# Patient Record
Sex: FEMALE | Race: WHITE | Employment: PART TIME | URBAN - METROPOLITAN AREA
[De-identification: names, ages, dates, MRNs, and addresses within clinical notes are randomized per-mention and may not be internally consistent; named-entity substitution may affect disease eponyms.]

---

## 2022-01-25 ENCOUNTER — OFFICE VISIT (OUTPATIENT)
Dept: OBGYN CLINIC | Age: 27
End: 2022-01-25
Payer: MEDICARE

## 2022-01-25 ENCOUNTER — HOSPITAL ENCOUNTER (OUTPATIENT)
Age: 27
Setting detail: SPECIMEN
Discharge: HOME | End: 2022-01-25

## 2022-01-25 ENCOUNTER — HOSPITAL ENCOUNTER (OUTPATIENT)
Facility: CLINIC | Age: 27
Discharge: HOME | End: 2022-01-25
Payer: MEDICARE

## 2022-01-25 VITALS
BODY MASS INDEX: 39.74 KG/M2 | WEIGHT: 224.3 LBS | HEIGHT: 63 IN | DIASTOLIC BLOOD PRESSURE: 73 MMHG | HEART RATE: 104 BPM | SYSTOLIC BLOOD PRESSURE: 119 MMHG

## 2022-01-25 DIAGNOSIS — O09.91 HIGH-RISK PREGNANCY IN FIRST TRIMESTER: ICD-10-CM

## 2022-01-25 DIAGNOSIS — O99.210 OBESITY IN PREGNANCY: ICD-10-CM

## 2022-01-25 DIAGNOSIS — D69.1 DELTA STORAGE POOL DISEASE (HCC): ICD-10-CM

## 2022-01-25 DIAGNOSIS — N92.6 MISSED MENSES: Primary | ICD-10-CM

## 2022-01-25 DIAGNOSIS — Z32.01 POSITIVE PREGNANCY TEST: ICD-10-CM

## 2022-01-25 PROBLEM — O09.90 HIGH RISK PREGNANCY, ANTEPARTUM: Status: ACTIVE | Noted: 2022-01-25

## 2022-01-25 LAB
ABO/RH: NORMAL
AMPHETAMINE SCREEN URINE: NEGATIVE
ANTIBODY SCREEN: NEGATIVE
BARBITURATE SCREEN URINE: NEGATIVE
BENZODIAZEPINE SCREEN, URINE: NEGATIVE
BUPRENORPHINE URINE: NORMAL
CANNABINOID SCREEN URINE: NEGATIVE
COCAINE METABOLITE, URINE: NEGATIVE
CONTROL: YES
HEPATITIS C ANTIBODY: NONREACTIVE
HIV AG/AB: NONREACTIVE
MDMA URINE: NORMAL
METHADONE SCREEN, URINE: NEGATIVE
METHAMPHETAMINE, URINE: NORMAL
OPIATES, URINE: NEGATIVE
OXYCODONE SCREEN URINE: NEGATIVE
PHENCYCLIDINE, URINE: NEGATIVE
PREGNANCY TEST URINE, POC: POSITIVE
PROPOXYPHENE, URINE: NORMAL
TEST INFORMATION: NORMAL
TRICYCLIC ANTIDEPRESSANTS, UR: NORMAL

## 2022-01-25 PROCEDURE — 82950 GLUCOSE TEST: CPT

## 2022-01-25 PROCEDURE — 87591 N.GONORRHOEAE DNA AMP PROB: CPT

## 2022-01-25 PROCEDURE — 87491 CHLMYD TRACH DNA AMP PROBE: CPT

## 2022-01-25 PROCEDURE — 80307 DRUG TEST PRSMV CHEM ANLYZR: CPT

## 2022-01-25 PROCEDURE — 1036F TOBACCO NON-USER: CPT | Performed by: ADVANCED PRACTICE MIDWIFE

## 2022-01-25 PROCEDURE — G8427 DOCREV CUR MEDS BY ELIG CLIN: HCPCS | Performed by: ADVANCED PRACTICE MIDWIFE

## 2022-01-25 PROCEDURE — G8417 CALC BMI ABV UP PARAM F/U: HCPCS | Performed by: ADVANCED PRACTICE MIDWIFE

## 2022-01-25 PROCEDURE — G8484 FLU IMMUNIZE NO ADMIN: HCPCS | Performed by: ADVANCED PRACTICE MIDWIFE

## 2022-01-25 PROCEDURE — 36415 COLL VENOUS BLD VENIPUNCTURE: CPT

## 2022-01-25 PROCEDURE — 81025 URINE PREGNANCY TEST: CPT | Performed by: ADVANCED PRACTICE MIDWIFE

## 2022-01-25 PROCEDURE — 87086 URINE CULTURE/COLONY COUNT: CPT

## 2022-01-25 PROCEDURE — 99214 OFFICE O/P EST MOD 30 MIN: CPT | Performed by: ADVANCED PRACTICE MIDWIFE

## 2022-01-25 RX ORDER — SWAB
1 SWAB, NON-MEDICATED MISCELLANEOUS DAILY
Qty: 30 TABLET | Refills: 11 | Status: SHIPPED | OUTPATIENT
Start: 2022-01-25

## 2022-01-25 SDOH — ECONOMIC STABILITY: TRANSPORTATION INSECURITY
IN THE PAST 12 MONTHS, HAS THE LACK OF TRANSPORTATION KEPT YOU FROM MEDICAL APPOINTMENTS OR FROM GETTING MEDICATIONS?: NO

## 2022-01-25 SDOH — ECONOMIC STABILITY: FOOD INSECURITY: WITHIN THE PAST 12 MONTHS, YOU WORRIED THAT YOUR FOOD WOULD RUN OUT BEFORE YOU GOT MONEY TO BUY MORE.: NEVER TRUE

## 2022-01-25 SDOH — ECONOMIC STABILITY: FOOD INSECURITY: WITHIN THE PAST 12 MONTHS, THE FOOD YOU BOUGHT JUST DIDN'T LAST AND YOU DIDN'T HAVE MONEY TO GET MORE.: NEVER TRUE

## 2022-01-25 SDOH — ECONOMIC STABILITY: TRANSPORTATION INSECURITY
IN THE PAST 12 MONTHS, HAS LACK OF TRANSPORTATION KEPT YOU FROM MEETINGS, WORK, OR FROM GETTING THINGS NEEDED FOR DAILY LIVING?: NO

## 2022-01-25 ASSESSMENT — PATIENT HEALTH QUESTIONNAIRE - PHQ9
SUM OF ALL RESPONSES TO PHQ QUESTIONS 1-9: 0
SUM OF ALL RESPONSES TO PHQ QUESTIONS 1-9: 0
2. FEELING DOWN, DEPRESSED OR HOPELESS: 0
1. LITTLE INTEREST OR PLEASURE IN DOING THINGS: 0
SUM OF ALL RESPONSES TO PHQ9 QUESTIONS 1 & 2: 0
SUM OF ALL RESPONSES TO PHQ QUESTIONS 1-9: 0
SUM OF ALL RESPONSES TO PHQ QUESTIONS 1-9: 0

## 2022-01-25 ASSESSMENT — SOCIAL DETERMINANTS OF HEALTH (SDOH): HOW HARD IS IT FOR YOU TO PAY FOR THE VERY BASICS LIKE FOOD, HOUSING, MEDICAL CARE, AND HEATING?: NOT HARD AT ALL

## 2022-01-25 NOTE — PROGRESS NOTES
Pt is here today for her AMENORRHEA appointment  LMP: approx 11/01/2021  Patient has no questions or concerns

## 2022-01-25 NOTE — PROGRESS NOTES
SUBJECTIVE:    Chief Complaint:  Chief Complaint   Patient presents with    Amenorrhea     lmp 2021 approx. HPI:  Jan Romero is a 27 y.o. female being seen today for missed menses. She reports a positive pregnancy test on end of 2021. This is not a planned pregnancy. She is accepting at this time. LMP: Patient's last menstrual period was 2021 (within days). Sure and definite: Yes. Regular monthly cycle: Yes    She was not on a contraceptive at the time of conception. Estimated weeks gestation today: 12w1d  Tentative YOAN: 2022    Relationship with FOB: Vee Ilda, engaged  G1, and G3 same FOB    OBJECTIVE:    /73   Pulse 104   Ht 5' 2.5\" (1.588 m)   Wt 224 lb 4.8 oz (101.7 kg)   LMP 2021 (Within Days)  Body mass index is 40.37 kg/m². Mother's ethnicity:    Father's ethnicity:      She is complaining today of:   Pain: No  Cramping: No  Bleeding or spotting: No    Nausea: No  Vomiting: No    Breast enlargement/tenderness: Yes  Fatigue: No  Frequency of urination: Yes    Previous high risk obstetrical history: No  OB History    Para Term  AB Living   2 2 2     2   SAB IAB Ectopic Molar Multiple Live Births             2      # Outcome Date GA Lbr Torey/2nd Weight Sex Delivery Anes PTL Lv   2 Term 18 40w4d 08:32 / 01:59 7 lb (3.175 kg) F Vag-Spont EPI N RAYNA   1 Term 01/15/16 39w1d  6 lb 5.6 oz (2.88 kg) M Vag-Spont EPI N RAYNA      Complications: Vacuum extractor delivery, delivered       ROS was done and is negative except as documented in HPI. History was reviewed as documented on Epic Navigator.  bpm via doppler     ASSESSMENT/PLAN:  Missed menses with + UCG  · UCG was done and noted as positive  · Prenatal vitamins were ordered:  Yes  · Discussed self-help for nausea, avoiding OTC medications until consulting provider or pharmacist.  · Ultrasound for dating and viability was ordered and instructed to complete before OB Hx visit: Yes   · Initial information on genetic testing was given: Yes  · Reviewed option for 1st trimester screening ([facility] screen at [facility]) at 10-13 weeks or Myriad NIPS which will be drawn at next provider visit (as long as > 10 weeks GA). Missed window for 1st tri screen. · Reviewed initial prenatal labs will be ordered today, instructed to complete before OB Hx visit. She consents to UDS and HIV: Yes  · 1 hour glucola needed: Yes  · She was instructed to call with any concerns or worsening of any symptoms  · Given New OB packet (nausea/vomiting in pregnancy, flu vaccine, safe medications in pregnancy, weight gain recommendations in pregnancy). 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 Return in about 2 weeks (around 2/8/2022) for OB history with Gina. The patient, Kiesha Jaime, was seen with a total time spent of 30 minutes for the visit on this date of service by the [facility]  The time component involved both face-to-face (counseling and education) and non face-to-face time (care coordination), spent in determining the total time component.       Electronically signed by: MONI Rodriguez CNM

## 2022-01-26 PROBLEM — Z28.39 SUSCEPTIBLE TO VARICELLA (NON-IMMUNE), CURRENTLY PREGNANT: Status: ACTIVE | Noted: 2022-01-26

## 2022-01-26 PROBLEM — O99.210 OBESITY IN PREGNANCY: Status: ACTIVE | Noted: 2022-01-26

## 2022-01-26 PROBLEM — O09.899 SUSCEPTIBLE TO VARICELLA (NON-IMMUNE), CURRENTLY PREGNANT: Status: ACTIVE | Noted: 2022-01-26

## 2022-01-26 LAB
ABSOLUTE EOS #: 0.23 K/UL (ref 0–0.44)
ABSOLUTE IMMATURE GRANULOCYTE: 0.04 K/UL (ref 0–0.3)
ABSOLUTE LYMPH #: 1.88 K/UL (ref 1.1–3.7)
ABSOLUTE MONO #: 0.74 K/UL (ref 0.1–1.2)
BASOPHILS # BLD: 0 % (ref 0–2)
BASOPHILS ABSOLUTE: 0.03 K/UL (ref 0–0.2)
C. TRACHOMATIS DNA ,URINE: NEGATIVE
DIFFERENTIAL TYPE: ABNORMAL
EOSINOPHILS RELATIVE PERCENT: 2 % (ref 1–4)
GLUCOSE ADMINISTRATION: NORMAL
GLUCOSE TOLERANCE SCREEN 50G: 90 MG/DL (ref 70–135)
HCT VFR BLD CALC: 38.6 % (ref 36.3–47.1)
HEMOGLOBIN: 12.7 G/DL (ref 11.9–15.1)
HEPATITIS B SURFACE ANTIGEN: NONREACTIVE
IMMATURE GRANULOCYTES: 0 %
LYMPHOCYTES # BLD: 19 % (ref 24–43)
MCH RBC QN AUTO: 30.8 PG (ref 25.2–33.5)
MCHC RBC AUTO-ENTMCNC: 32.9 G/DL (ref 28.4–34.8)
MCV RBC AUTO: 93.5 FL (ref 82.6–102.9)
MONOCYTES # BLD: 8 % (ref 3–12)
N. GONORRHOEAE DNA, URINE: NEGATIVE
NRBC AUTOMATED: 0 PER 100 WBC
PDW BLD-RTO: 13.1 % (ref 11.8–14.4)
PLATELET # BLD: 310 K/UL (ref 138–453)
PLATELET ESTIMATE: ABNORMAL
PMV BLD AUTO: 9.6 FL (ref 8.1–13.5)
RBC # BLD: 4.13 M/UL (ref 3.95–5.11)
RBC # BLD: ABNORMAL 10*6/UL
RUBV IGG SER QL: 26.9 IU/ML
SEG NEUTROPHILS: 71 % (ref 36–65)
SEGMENTED NEUTROPHILS ABSOLUTE COUNT: 6.81 K/UL (ref 1.5–8.1)
SPECIMEN DESCRIPTION: NORMAL
T. PALLIDUM, IGG: NONREACTIVE
VZV IGG SER QL IA: 1
WBC # BLD: 9.7 K/UL (ref 3.5–11.3)
WBC # BLD: ABNORMAL 10*3/UL

## 2022-01-27 LAB
CULTURE: NO GROWTH
Lab: NORMAL
SPECIMEN DESCRIPTION: NORMAL

## 2022-02-14 ENCOUNTER — TELEPHONE (OUTPATIENT)
Dept: OBGYN CLINIC | Age: 27
End: 2022-02-14

## 2022-02-14 NOTE — TELEPHONE ENCOUNTER
----- Message from MONI Yip CNM sent at 2/11/2022 12:59 PM EST -----  Regarding: RE: f/u  Also make sure to document your calls otherwise it doesn't look like we've tried to reach her. Thanks! !  ----- Message -----  From: Rayray Huff MA  Sent: 2/11/2022  12:40 PM EST  To: MONI Yip CNM  Subject: f/u                                              [facility]p patients and additional contact's numbers are no longer in service   ----- Message -----  From: MONI Yip CNM  Sent: 2/11/2022  12:18 PM EST  To: Rayray Huff MA    Can we call her and see if she's still staying with us or what's going on? She never did her dating U/S or scheduled her IPV.  Thanks

## 2022-02-22 ENCOUNTER — HOSPITAL ENCOUNTER (OUTPATIENT)
Dept: ULTRASOUND IMAGING | Facility: CLINIC | Age: 27
Discharge: HOME | End: 2022-02-24
Payer: MEDICARE

## 2022-02-22 DIAGNOSIS — Z32.01 POSITIVE PREGNANCY TEST: ICD-10-CM

## 2022-02-22 DIAGNOSIS — O09.90 HIGH RISK PREGNANCY, ANTEPARTUM: Primary | ICD-10-CM

## 2022-02-22 DIAGNOSIS — D69.1 DELTA STORAGE POOL DISEASE (HCC): ICD-10-CM

## 2022-02-22 DIAGNOSIS — O99.210 OBESITY IN PREGNANCY: ICD-10-CM

## 2022-02-22 PROCEDURE — 76805 OB US >/= 14 WKS SNGL FETUS: CPT

## 2022-03-08 ENCOUNTER — INITIAL PRENATAL (OUTPATIENT)
Dept: OBGYN CLINIC | Age: 27
End: 2022-03-08
Payer: MEDICARE

## 2022-03-08 VITALS
DIASTOLIC BLOOD PRESSURE: 73 MMHG | SYSTOLIC BLOOD PRESSURE: 114 MMHG | WEIGHT: 223.5 LBS | HEART RATE: 90 BPM | HEIGHT: 62 IN | BODY MASS INDEX: 41.13 KG/M2

## 2022-03-08 DIAGNOSIS — R55 NEUROLOGIC CARDIAC SYNCOPE: ICD-10-CM

## 2022-03-08 DIAGNOSIS — O09.90 HIGH RISK PREGNANCY, ANTEPARTUM: Primary | ICD-10-CM

## 2022-03-08 DIAGNOSIS — R01.1 MURMUR: ICD-10-CM

## 2022-03-08 DIAGNOSIS — Z3A.18 18 WEEKS GESTATION OF PREGNANCY: ICD-10-CM

## 2022-03-08 DIAGNOSIS — O09.31 INSUFFICIENT PRENATAL CARE IN FIRST TRIMESTER: ICD-10-CM

## 2022-03-08 DIAGNOSIS — D69.1 DELTA STORAGE POOL DISEASE (HCC): ICD-10-CM

## 2022-03-08 DIAGNOSIS — O99.210 OBESITY IN PREGNANCY: ICD-10-CM

## 2022-03-08 PROCEDURE — G8427 DOCREV CUR MEDS BY ELIG CLIN: HCPCS | Performed by: ADVANCED PRACTICE MIDWIFE

## 2022-03-08 PROCEDURE — G8417 CALC BMI ABV UP PARAM F/U: HCPCS | Performed by: ADVANCED PRACTICE MIDWIFE

## 2022-03-08 PROCEDURE — G8484 FLU IMMUNIZE NO ADMIN: HCPCS | Performed by: ADVANCED PRACTICE MIDWIFE

## 2022-03-08 PROCEDURE — 1036F TOBACCO NON-USER: CPT | Performed by: ADVANCED PRACTICE MIDWIFE

## 2022-03-08 PROCEDURE — 99215 OFFICE O/P EST HI 40 MIN: CPT | Performed by: ADVANCED PRACTICE MIDWIFE

## 2022-03-08 RX ORDER — ASPIRIN 81 MG/1
81 TABLET, CHEWABLE ORAL DAILY
Qty: 30 TABLET | Refills: 5 | Status: ON HOLD
Start: 2022-03-08 | End: 2022-08-02 | Stop reason: HOSPADM

## 2022-03-08 SDOH — ECONOMIC STABILITY: FOOD INSECURITY: WITHIN THE PAST 12 MONTHS, YOU WORRIED THAT YOUR FOOD WOULD RUN OUT BEFORE YOU GOT MONEY TO BUY MORE.: NEVER TRUE

## 2022-03-08 SDOH — ECONOMIC STABILITY: FOOD INSECURITY: WITHIN THE PAST 12 MONTHS, THE FOOD YOU BOUGHT JUST DIDN'T LAST AND YOU DIDN'T HAVE MONEY TO GET MORE.: NEVER TRUE

## 2022-03-08 ASSESSMENT — SOCIAL DETERMINANTS OF HEALTH (SDOH)
WITHIN THE LAST YEAR, HAVE YOU BEEN HUMILIATED OR EMOTIONALLY ABUSED IN OTHER WAYS BY YOUR PARTNER OR EX-PARTNER?: NO
WITHIN THE LAST YEAR, HAVE YOU BEEN KICKED, HIT, SLAPPED, OR OTHERWISE PHYSICALLY HURT BY YOUR PARTNER OR EX-PARTNER?: NO
HOW HARD IS IT FOR YOU TO PAY FOR THE VERY BASICS LIKE FOOD, HOUSING, MEDICAL CARE, AND HEATING?: NOT HARD AT ALL
WITHIN THE LAST YEAR, HAVE TO BEEN RAPED OR FORCED TO HAVE ANY KIND OF SEXUAL ACTIVITY BY YOUR PARTNER OR EX-PARTNER?: NO
WITHIN THE LAST YEAR, HAVE YOU BEEN AFRAID OF YOUR PARTNER OR EX-PARTNER?: NO

## 2022-03-08 ASSESSMENT — PATIENT HEALTH QUESTIONNAIRE - PHQ9
SUM OF ALL RESPONSES TO PHQ QUESTIONS 1-9: 0
SUM OF ALL RESPONSES TO PHQ QUESTIONS 1-9: 0
1. LITTLE INTEREST OR PLEASURE IN DOING THINGS: 0
SUM OF ALL RESPONSES TO PHQ QUESTIONS 1-9: 0
2. FEELING DOWN, DEPRESSED OR HOPELESS: 0
SUM OF ALL RESPONSES TO PHQ9 QUESTIONS 1 & 2: 0
SUM OF ALL RESPONSES TO PHQ QUESTIONS 1-9: 0

## 2022-03-08 ASSESSMENT — ANXIETY QUESTIONNAIRES
GAD7 TOTAL SCORE: 0
1. FEELING NERVOUS, ANXIOUS, OR ON EDGE: 0
7. FEELING AFRAID AS IF SOMETHING AWFUL MIGHT HAPPEN: 0
3. WORRYING TOO MUCH ABOUT DIFFERENT THINGS: 0
2. NOT BEING ABLE TO STOP OR CONTROL WORRYING: 0
6. BECOMING EASILY ANNOYED OR IRRITABLE: 0
5. BEING SO RESTLESS THAT IT IS HARD TO SIT STILL: 0
4. TROUBLE RELAXING: 0

## 2022-03-08 NOTE — PROGRESS NOTES
[facility]  [place] [address] [address] Stephany Galvez [address] [facility] [address]  Dept: [phone]    New Obstetric Intake     Subjective:    Patient Libertad Pedraza  Patient Age: 27 y. o. Date of Visit: 3/8/2022  Patient's estimated gestational age at visit: 18w1d    Any Concerns Today: no; states wrong number has been on file so she has not gotten ours/MFM's calls  Patient reports no complaints. She denies spotting, cramping or pain. FOB not involved, infidelity. OB History        3    Para   2    Term   2            AB        Living   2       SAB        IAB        Ectopic        Molar        Multiple        Live Births   2                Information about this pregnancy:    Planned Pregnancy: No, Accepting: Yes   Father of Baby: Mono - NOT INVOLVED   Patient's last menstrual period was 2021 (within days). , Regular menses:  Yes   Date of Last Pap Smear: ? Wants at 6 week PP   On Birth Control at Time of Conception: no   Early Dating Ultrasound: completed   Desires first trimester screening: No too late, M to offer NIPT   Desires CF/SMA/FragileX screening: CF negative last pregnancy    Risk Screening   Patient Occupation: [employer], Environmental/Work Hazards: No   Smoker: No   History of Substance Abuse: no   History of Sexual Abuse: no   Current of past history of intimate partner violence: no   Teratogen Exposure since finding out pregnant: no   Pets at home: no    Infection History:   Personal History of Chicken Pox or varicella vaccination: non immune per blood work    Agreeable to flu shot: No   Agreeable to tdap: unsure    Exposed to TB or live with someone with TB: no   Patient or partner history of genital herpes: no   Rash or viral illness since last menstrual period: no   Prior GBS-infected child: no   History of sexually transmitted infection(s) (STIs): no    Any recent travel within the last 3 months out of the country: no, Partner: no    Previous Birth History:    Vaginal Birth: yes    Birth: no   Any previous birth complications: no   History of hemorrhage during/after birth: no    High Risk Factor Screening for this Pregnancy:   Age greater than 28 at delivery: No   History of  delivery: no   History of diabetes (gestational or outside of pregnancy): No, On medications: No   Screening for pregestational diabetes:   o BMI greater than 30: Yes. If Yes, need early glucola  o BMI greater than 25 ( Americans greater than 23): No If Yes, need 1 more risk factor.   - Physical inactivity: No  - First-degree relative with diabetes: No  - High-risk race of ethnicity (eg, [place] American, , , [place] American, [facility]): No  - Previously given birth to an infant weighing 1bef44xf (4000g) or more: No  - History of hypertension: No  - HDL < 35mg/dL and/or a trglyceride level greater than 250 mg/dL: No  - History of polycystic ovarian syndrome: No  - A1c greater than or equal to 5.7%: No   History of Hypertension: No, On medications: NA   History of bleeding disorders: No    See Epic Navigator for further genetic and risk screening. Objective:  /73   Pulse 90   Ht 5' 2\" (1.575 m)   Wt 223 lb 8 oz (101.4 kg)   LMP 2021 (Within Days)   BMI 40.88 kg/m²   Body mass index is 40.88 kg/m². OB Physical - proven pelvis 7#, declines pap till 6 weeks PP      Mother's Ethnicity:   Father's Ethnicity:     Assessment/Plan:  Pregnancy at 21w2d    Role of ultrasound in pregnancy discussed; fetal survey: requests   She was counseled on office policies. She was educated about the anticipated course of prenatal care.  Warning signs were reviewed.    The patient was counseled on drug screening, HIV, Cystic Fibrosis, SMA and Sickle Cell disease, as appropriate.   o HIV and UDS already done, negative  o CF already negative last pregnancy   She was counseled on Toxoplasmosis/Listeriosis (cats/raw meat).  She denies tobacco use.  The patient was informed of a 2-4% risk of congenital anomalies in the general population. She was questioned in detail regarding any genetic misnomer history, chromosomal abnormalities, or learning disabilities in herself, the father of the baby or their families. She denies any history as stated above.  Too late for 1st tri screen, reviewed NIPT will be offered by MF    AFP discussed and DECLINED today    Encouraged well-balanced diet, plenty of rest when needed, prenatal vitamins daily and walking for exercise. Discussed self-help for nausea, avoiding OTC medications until consulting provider or pharmacist, other than Tylenol PRN, minimal caffeine (1-2 cups daily) and avoiding alcohol.  The patient was informed that [facility] only delivers at [address] and is agreeable to delivery at [address]. High risk pregnancy, antepartum  · Referral for anatomy scan already in place, had wrong number in chart so MFM unable to reach her. Given [facility] number to call and get scheduled. Delta storage pool disease [facility])  · Not following with specialist  · MFM referral already in place for consultation    Obesity in pregnancy  · aspirin (ASPIRIN CHILDRENS) 81 MG chewable tablet; Take 1 tablet by mouth daily  · Early glucola 90  · NSTs weekly at 34 weeks  · TWG -8 oz (-0.227 kg) - continue to monitor     Neurologic cardiac syncope  · Denies any recent issues, Hx Florinef use prior to pregnancy  · Reviewed care in pregnancy and s/s to report    Murmur  · Noted in chart from age 15 yo but pt was unaware, no murmur on exam  · Denies s/s, reviewed s/s to report    Insufficient prenatal care in first trimester  · Denies any social determinants of health needs, states had car issues and had the wrong number on file.  Had confirmation visit at 12 weeks and did not complete dating U/S for 4 weeks following. Reviewed importance of attending visits and she verbalizes understanding. Upon completion of the visit all questions were answered. ROS was done and is negative except as documented in HPI. History was reviewed as documented on Epic Navigator. The patient, Chris Corona, was seen with a total time spent of 45 minutes for the visit on this date of service by the [facility]  The time component involved both face-to-face (counseling and education) and non face-to-face time (care coordination), spent in determining the total time component. Return in about 4 weeks (around 4/5/2022) for OB visit with Midwives.     Electronically Signed by: MONI Noyola CNM

## 2022-03-08 NOTE — PROGRESS NOTES
Pt is here today for her IPV   Pt states she is feeling baby movement   Pt has no questions or concerns

## 2022-03-16 ENCOUNTER — TELEPHONE (OUTPATIENT)
Dept: OBGYN CLINIC | Age: 27
End: 2022-03-16

## 2022-03-16 NOTE — TELEPHONE ENCOUNTER
----- Message from Mirtha Martin, APRN - CNM sent at 3/15/2022  9:59 AM EDT -----  Tobi Shields let me know at her last visit that she got a new number and that's why no one has been able to reach her and I told her to tell the  her new number when she left and I don't think she did. Can you try to call her current number, and if no answer then try her HIPPA contact to get her new number. She needs to get her anatomy scan scheduled and I know they can't reach her. Once you reach her give her MFM's number so she can schedule ASAP.  Thanks

## 2022-03-16 NOTE — TELEPHONE ENCOUNTER
lvm for pt's contact \"Ioana\" to have pt call office. I tried number listed as pt's number and there was no answer and the vm was full.

## 2022-04-01 NOTE — PROGRESS NOTES
SUBJECTIVE:  [facility] is here for her return OB visit. She is doing well and offers no complaints today but does relate that she feels she is more symptomatic with dizziness than her previous pregnancies. Has not heard from [facility] for [address] she says  She reports fetal movement. She denies vaginal bleeding. She denies vaginal discharge. She denies leaking of fluid. She denies uterine contraction activity. She denies nausea and/or vomiting. She denies retaining fluid in her extremities. OBJECTIVE:  Blood pressure 103/69, pulse 85, weight 228 lb 4.8 oz (103.6 kg), last menstrual period 11/01/2021. ASSESSMENT/PLAN:  1. 22 weeks gestation of pregnancy  S=D    2. High risk pregnancy, antepartum  The problem list was reviewed and updated as needed with any new issues today    [facility] will begin to monitor fetal movement daily    AFP previously discussed  Deferred genetic testing to [facility] as needed  28 week Labs were discussed for NOV    BMI and appropriate weight gain recommendations were discussed. Obese: BMI > 30: Gain 11-20 lb    3. 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 Neurologic cardiac syncope  - More symptomatic than previous; stressed increased fluid and salt intake  - Advised if continues to worsen may need to see Cardiology    5. Obesity in pregnancy (Pregravid BMI 40)  - State is taking ASA        [facility] was counseled regarding all of the above      The patient, Ashwini Tse,  was seen with a total time spent of 30 minutes for the visit on this date of service by the [facility]  The time component involved both face-to-face (counseling and education) and non face-to-face time (care coordination), spent in determining the total time component.

## 2022-04-05 ENCOUNTER — ROUTINE PRENATAL (OUTPATIENT)
Dept: OBGYN CLINIC | Age: 27
End: 2022-04-05
Payer: MEDICARE

## 2022-04-05 VITALS
WEIGHT: 228.3 LBS | BODY MASS INDEX: 41.76 KG/M2 | DIASTOLIC BLOOD PRESSURE: 69 MMHG | HEART RATE: 85 BPM | SYSTOLIC BLOOD PRESSURE: 103 MMHG

## 2022-04-05 DIAGNOSIS — R55 NEUROLOGIC CARDIAC SYNCOPE: ICD-10-CM

## 2022-04-05 DIAGNOSIS — D69.1 DELTA STORAGE POOL DISEASE (HCC): ICD-10-CM

## 2022-04-05 DIAGNOSIS — Z3A.22 22 WEEKS GESTATION OF PREGNANCY: ICD-10-CM

## 2022-04-05 DIAGNOSIS — O09.90 HIGH RISK PREGNANCY, ANTEPARTUM: Primary | ICD-10-CM

## 2022-04-05 DIAGNOSIS — O99.210 OBESITY IN PREGNANCY: ICD-10-CM

## 2022-04-05 PROCEDURE — 99214 OFFICE O/P EST MOD 30 MIN: CPT | Performed by: ADVANCED PRACTICE MIDWIFE

## 2022-04-05 PROCEDURE — G8417 CALC BMI ABV UP PARAM F/U: HCPCS | Performed by: ADVANCED PRACTICE MIDWIFE

## 2022-04-05 PROCEDURE — G8427 DOCREV CUR MEDS BY ELIG CLIN: HCPCS | Performed by: ADVANCED PRACTICE MIDWIFE

## 2022-04-05 PROCEDURE — 1036F TOBACCO NON-USER: CPT | Performed by: ADVANCED PRACTICE MIDWIFE

## 2022-04-05 NOTE — PROGRESS NOTES
Pt is here today at her 22w1d prenatal visit  Pt states fetal movement is present  Pt has not seen MFM for anatomy pt states referral not sent over yet. referral was sent 2/22 per MFM unable to contact patient d/t phone number not working. Verified number with patient .      Pt did not leave a urine

## 2022-05-04 ENCOUNTER — ROUTINE PRENATAL (OUTPATIENT)
Dept: OBGYN CLINIC | Age: 27
End: 2022-05-04
Payer: MEDICARE

## 2022-05-04 ENCOUNTER — HOSPITAL ENCOUNTER (OUTPATIENT)
Age: 27
Setting detail: SPECIMEN
Discharge: HOME | End: 2022-05-04

## 2022-05-04 VITALS — BODY MASS INDEX: 43.49 KG/M2 | WEIGHT: 237.8 LBS | SYSTOLIC BLOOD PRESSURE: 124 MMHG | DIASTOLIC BLOOD PRESSURE: 74 MMHG

## 2022-05-04 DIAGNOSIS — Z3A.26 26 WEEKS GESTATION OF PREGNANCY: Primary | ICD-10-CM

## 2022-05-04 DIAGNOSIS — Z23 NEED FOR TDAP VACCINATION: ICD-10-CM

## 2022-05-04 DIAGNOSIS — O09.90 HIGH RISK PREGNANCY, ANTEPARTUM: ICD-10-CM

## 2022-05-04 DIAGNOSIS — O99.210 OBESITY IN PREGNANCY: ICD-10-CM

## 2022-05-04 PROCEDURE — G8427 DOCREV CUR MEDS BY ELIG CLIN: HCPCS | Performed by: ADVANCED PRACTICE MIDWIFE

## 2022-05-04 PROCEDURE — 1036F TOBACCO NON-USER: CPT | Performed by: ADVANCED PRACTICE MIDWIFE

## 2022-05-04 PROCEDURE — G8417 CALC BMI ABV UP PARAM F/U: HCPCS | Performed by: ADVANCED PRACTICE MIDWIFE

## 2022-05-04 PROCEDURE — 99213 OFFICE O/P EST LOW 20 MIN: CPT | Performed by: ADVANCED PRACTICE MIDWIFE

## 2022-05-04 NOTE — PROGRESS NOTES
SUBJECTIVE:  Najma Paulson is here for her return OB visit. She reports fetal movement. She denies vaginal bleeding. She denies vaginal discharge. She denies leaking of fluid. She denies uterine contraction activity. She denies nausea and/or vomiting. She denies retaining fluid in her extremities. Doing GCT today. OBJECTIVE:  Blood pressure 124/74, weight 237 lb 12.8 oz (107.9 kg), last menstrual period 11/01/2021. Najma Paulson has not the flu vaccine as appropriate  Najma Paulson has the Tdap vaccine as appropriate        ASSESSMENT/PLAN:  1. 26 weeks gestation of pregnancy    - Glucose tolerance, 1 hour; Future  - HIV Screen; Future  - T. pallidum Ab; Future  - CBC with Auto Differential; Future    2. Need for Tdap vaccination    - Tetanus-Diphth-Acell Pertussis (BOOSTRIX) injection 0.5 mL    3. Obesity in pregnancy (Pregravid BMI 40)      4. High risk pregnancy, antepartum        The problem list was reviewed and updated with any new issues from today's visit    Najma Paulson will monitor fetal movement daily. 28 week lab panel was discussed and was not ordered. Najma Paulson was counseled regarding all of the above    The patient, Ora Dennis,  was seen with a total time spent of 20 minutes for the visit on this date of service by the [facility]  The time component, involved both face-to-face (counseling and education)  and non face-to-face time (care coordination), spent in determining the total time component.

## 2022-05-04 NOTE — PROGRESS NOTES
Pt is here today at her 26w2 prenatal visit  Pt states fetal movement is present  Pt has no questions or concerns  Pt would like Tdap  After obtaining consent, and per orders of  Nia Selby CNM injection of Tdap given in Left deltoid by Kierra Heath MA.  Patient instructed to remain in clinic for 20 minutes afterwards, and to report any adverse reaction to me immediately  [address] [id number]  Lot [id number]  EXP 1/31/23      50g Glucola given to patient  Finish 1:31p  Draw 2:31p    [address]  [id number]  [id number]  EXP10/31/23

## 2022-05-05 DIAGNOSIS — Z3A.26 26 WEEKS GESTATION OF PREGNANCY: ICD-10-CM

## 2022-05-05 LAB
ABSOLUTE EOS #: 0.16 K/UL (ref 0–0.44)
ABSOLUTE IMMATURE GRANULOCYTE: 0.14 K/UL (ref 0–0.3)
ABSOLUTE LYMPH #: 1.93 K/UL (ref 1.1–3.7)
ABSOLUTE MONO #: 1 K/UL (ref 0.1–1.2)
BASOPHILS # BLD: 0 % (ref 0–2)
BASOPHILS ABSOLUTE: 0.04 K/UL (ref 0–0.2)
EOSINOPHILS RELATIVE PERCENT: 1 % (ref 1–4)
GLUCOSE ADMINISTRATION: NORMAL
GLUCOSE TOLERANCE SCREEN 50G: 81 MG/DL (ref 70–135)
HCT VFR BLD CALC: 36.7 % (ref 36.3–47.1)
HEMOGLOBIN: 11.8 G/DL (ref 11.9–15.1)
IMMATURE GRANULOCYTES: 1 %
LYMPHOCYTES # BLD: 13 % (ref 24–43)
MCH RBC QN AUTO: 32.7 PG (ref 25.2–33.5)
MCHC RBC AUTO-ENTMCNC: 32.2 G/DL (ref 28.4–34.8)
MCV RBC AUTO: 101.7 FL (ref 82.6–102.9)
MONOCYTES # BLD: 7 % (ref 3–12)
NRBC AUTOMATED: 0 PER 100 WBC
PDW BLD-RTO: 13.2 % (ref 11.8–14.4)
PLATELET # BLD: 359 K/UL (ref 138–453)
PMV BLD AUTO: 10.1 FL (ref 8.1–13.5)
RBC # BLD: 3.61 M/UL (ref 3.95–5.11)
SEG NEUTROPHILS: 78 % (ref 36–65)
SEGMENTED NEUTROPHILS ABSOLUTE COUNT: 11.74 K/UL (ref 1.5–8.1)
T. PALLIDUM, IGG: NONREACTIVE
WBC # BLD: 15 K/UL (ref 3.5–11.3)

## 2022-05-07 LAB — HIV AG/AB: NONREACTIVE

## 2022-05-12 ENCOUNTER — ROUTINE PRENATAL (OUTPATIENT)
Dept: PERINATAL CARE | Age: 27
End: 2022-05-12
Payer: MEDICARE

## 2022-05-12 ENCOUNTER — HOSPITAL ENCOUNTER (OUTPATIENT)
Age: 27
Setting detail: SPECIMEN
Discharge: HOME | End: 2022-05-12

## 2022-05-12 VITALS
DIASTOLIC BLOOD PRESSURE: 66 MMHG | HEART RATE: 92 BPM | HEIGHT: 62 IN | WEIGHT: 236 LBS | TEMPERATURE: 97 F | RESPIRATION RATE: 16 BRPM | SYSTOLIC BLOOD PRESSURE: 110 MMHG | BODY MASS INDEX: 43.43 KG/M2

## 2022-05-12 DIAGNOSIS — O99.213 OBESITY AFFECTING PREGNANCY IN THIRD TRIMESTER: Primary | ICD-10-CM

## 2022-05-12 DIAGNOSIS — D69.1 MATERNAL PLATELET DISORDER AFFECTING PREGNANCY IN THIRD TRIMESTER, ANTEPARTUM (HCC): ICD-10-CM

## 2022-05-12 DIAGNOSIS — Z3A.27 27 WEEKS GESTATION OF PREGNANCY: ICD-10-CM

## 2022-05-12 DIAGNOSIS — O35.2XX0 HEREDITARY FAMILIAL DISEASE AFFECTING MANAGEMENT OF MOTHER AND POSSIBLY AFFECTING FETUS, ANTEPARTUM, SINGLE OR UNSPECIFIED FETUS: ICD-10-CM

## 2022-05-12 DIAGNOSIS — O09.33 INSUFFICIENT PRENATAL CARE IN THIRD TRIMESTER: ICD-10-CM

## 2022-05-12 DIAGNOSIS — O99.891 CURRENT MATERNAL CONDITION AFFECTING PREGNANCY: ICD-10-CM

## 2022-05-12 DIAGNOSIS — O99.113 MATERNAL PLATELET DISORDER AFFECTING PREGNANCY IN THIRD TRIMESTER, ANTEPARTUM (HCC): ICD-10-CM

## 2022-05-12 LAB
ABDOMINAL CIRCUMFERENCE: NORMAL
BIPARIETAL DIAMETER: NORMAL
ESTIMATED FETAL WEIGHT: NORMAL
FEMORAL DIAMETER: NORMAL
HC/AC: NORMAL
HEAD CIRCUMFERENCE: NORMAL

## 2022-05-12 PROCEDURE — 76811 OB US DETAILED SNGL FETUS: CPT | Performed by: OBSTETRICS & GYNECOLOGY

## 2022-05-12 PROCEDURE — 99999 PR OFFICE/OUTPT VISIT,PROCEDURE ONLY: CPT | Performed by: OBSTETRICS & GYNECOLOGY

## 2022-05-12 PROCEDURE — 76819 FETAL BIOPHYS PROFIL W/O NST: CPT | Performed by: OBSTETRICS & GYNECOLOGY

## 2022-06-01 ENCOUNTER — ROUTINE PRENATAL (OUTPATIENT)
Dept: OBGYN CLINIC | Age: 27
End: 2022-06-01
Payer: MEDICARE

## 2022-06-01 ENCOUNTER — CLINICAL DOCUMENTATION (OUTPATIENT)
Dept: OBGYN CLINIC | Age: 27
End: 2022-06-01

## 2022-06-01 VITALS
WEIGHT: 242 LBS | DIASTOLIC BLOOD PRESSURE: 80 MMHG | SYSTOLIC BLOOD PRESSURE: 123 MMHG | BODY MASS INDEX: 44.26 KG/M2 | HEART RATE: 103 BPM

## 2022-06-01 DIAGNOSIS — O09.90 HIGH RISK PREGNANCY, ANTEPARTUM: ICD-10-CM

## 2022-06-01 DIAGNOSIS — D69.1 DELTA STORAGE POOL DISEASE (HCC): ICD-10-CM

## 2022-06-01 DIAGNOSIS — Z3A.30 30 WEEKS GESTATION OF PREGNANCY: Primary | ICD-10-CM

## 2022-06-01 PROCEDURE — G8427 DOCREV CUR MEDS BY ELIG CLIN: HCPCS | Performed by: ADVANCED PRACTICE MIDWIFE

## 2022-06-01 PROCEDURE — 1036F TOBACCO NON-USER: CPT | Performed by: ADVANCED PRACTICE MIDWIFE

## 2022-06-01 PROCEDURE — 99213 OFFICE O/P EST LOW 20 MIN: CPT | Performed by: ADVANCED PRACTICE MIDWIFE

## 2022-06-01 PROCEDURE — G8417 CALC BMI ABV UP PARAM F/U: HCPCS | Performed by: ADVANCED PRACTICE MIDWIFE

## 2022-06-01 NOTE — PROGRESS NOTES
Pt is here today at her 30w4d prenatal visit  Pt states fetal movement is present  Pt needs refills on her PNV  Pt did not leave a urine sample

## 2022-06-01 NOTE — PROGRESS NOTES
SUBJECTIVE:  Dania Anderson is here for her return OB visit. She reports fetal movement. She denies  vaginal bleeding. She denies  vaginal discharge. She denies leaking of fluid. She denies uterine contraction activity. She denies nausea and/or vomiting. She denies retaining fluid in her extremities. Wants RRS after recovery. OBJECTIVE:  Blood pressure 123/80, pulse (!) 103, weight 242 lb (109.8 kg). Dania Anderson has not received the flu vaccine as appropriate  Dania Anderson has received the Tdap vaccine as appropriate          ASSESSMENT/PLAN:  1. 30 weeks gestation of pregnancy      2. High risk pregnancy, antepartum      3. 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 28 week lab results were reviewed. Fetal Kick Count was discussed and explained. Tylor-Faustin contractions vs  labor contractions were reviewed. Signs and symptoms of Pre-Eclampsia were were reviewed and discussed  Initial discussion regarding birth plans was begun    Dania Anderson was counseled regarding all of the above    The patient, Fabienne Joel,  was seen with a total time spent of 20 minutes for the visit on this date of service by the [facility]  The time component, involved both face-to-face (counseling and education)  and non face-to-face time (care coordination), spent in determining the total time component.

## 2022-06-15 ENCOUNTER — ROUTINE PRENATAL (OUTPATIENT)
Dept: OBGYN CLINIC | Age: 27
End: 2022-06-15
Payer: MEDICARE

## 2022-06-15 VITALS — WEIGHT: 246 LBS | BODY MASS INDEX: 44.99 KG/M2 | SYSTOLIC BLOOD PRESSURE: 130 MMHG | DIASTOLIC BLOOD PRESSURE: 78 MMHG

## 2022-06-15 DIAGNOSIS — Z3A.32 32 WEEKS GESTATION OF PREGNANCY: Primary | ICD-10-CM

## 2022-06-15 DIAGNOSIS — O09.90 HIGH RISK PREGNANCY, ANTEPARTUM: ICD-10-CM

## 2022-06-15 PROCEDURE — G8427 DOCREV CUR MEDS BY ELIG CLIN: HCPCS | Performed by: ADVANCED PRACTICE MIDWIFE

## 2022-06-15 PROCEDURE — 99213 OFFICE O/P EST LOW 20 MIN: CPT | Performed by: ADVANCED PRACTICE MIDWIFE

## 2022-06-15 PROCEDURE — G8417 CALC BMI ABV UP PARAM F/U: HCPCS | Performed by: ADVANCED PRACTICE MIDWIFE

## 2022-06-15 PROCEDURE — 1036F TOBACCO NON-USER: CPT | Performed by: ADVANCED PRACTICE MIDWIFE

## 2022-06-15 NOTE — PROGRESS NOTES
Pt is here today at her 32w4 prenatal visit  Pt states fetal movement is present  Pt has  No questions or concerns today.   Pt did not leave a urine

## 2022-06-15 NOTE — PROGRESS NOTES
SUBJECTIVE:  Jody Collazo is here for her return OB visit. She reports fetal movement. She denies  vaginal bleeding. She denies  vaginal discharge. She denies leaking of fluid. She denies uterine contraction activity. She denies nausea and/or vomiting. She denies retaining fluid in her extremities. Doing well, no concerns today. OBJECTIVE:  Blood pressure 130/78, weight 246 lb (111.6 kg). Jody Collazo has not received the flu vaccine as appropriate  Jody Collazo has received the Tdap vaccine as appropriate    She has not RhoGam as indicated      ASSESSMENT/PLAN:  1. 32 weeks gestation of pregnancy      2. High risk pregnancy, antepartum        The problem list was reviewed and updated with any new issues from today's visit      Jody Collazo will monitor fetal movement daily. 28 week lab results were reviewed. Fetal Kick Count was discussed and explained. Tylor-Faustin contractions vs  labor contractions were reviewed. Signs and symptoms of Pre-Eclampsia were were reviewed and discussed  Initial discussion regarding birth plans was begun    Jody Collazo was counseled regarding all of the above    The patient, Santos Wilcox,  was seen with a total time spent of 20 minutes for the visit on this date of service by the [facility]  The time component, involved both face-to-face (counseling and education)  and non face-to-face time (care coordination), spent in determining the total time component.

## 2022-06-29 ENCOUNTER — ROUTINE PRENATAL (OUTPATIENT)
Dept: OBGYN CLINIC | Age: 27
End: 2022-06-29
Payer: MEDICARE

## 2022-06-29 VITALS — DIASTOLIC BLOOD PRESSURE: 78 MMHG | BODY MASS INDEX: 45.36 KG/M2 | SYSTOLIC BLOOD PRESSURE: 114 MMHG | WEIGHT: 248 LBS

## 2022-06-29 DIAGNOSIS — Z3A.34 34 WEEKS GESTATION OF PREGNANCY: ICD-10-CM

## 2022-06-29 DIAGNOSIS — O09.90 HIGH RISK PREGNANCY, ANTEPARTUM: Primary | ICD-10-CM

## 2022-06-29 DIAGNOSIS — O99.210 OBESITY IN PREGNANCY: ICD-10-CM

## 2022-06-29 PROCEDURE — 99213 OFFICE O/P EST LOW 20 MIN: CPT | Performed by: ADVANCED PRACTICE MIDWIFE

## 2022-06-29 PROCEDURE — 1036F TOBACCO NON-USER: CPT | Performed by: ADVANCED PRACTICE MIDWIFE

## 2022-06-29 PROCEDURE — G8427 DOCREV CUR MEDS BY ELIG CLIN: HCPCS | Performed by: ADVANCED PRACTICE MIDWIFE

## 2022-06-29 PROCEDURE — 59025 FETAL NON-STRESS TEST: CPT | Performed by: ADVANCED PRACTICE MIDWIFE

## 2022-06-29 PROCEDURE — G8417 CALC BMI ABV UP PARAM F/U: HCPCS | Performed by: ADVANCED PRACTICE MIDWIFE

## 2022-06-29 NOTE — PROGRESS NOTES
SUBJECTIVE:    Fei Roper is here for her return OB visit. She reports  feeling fetal movement. She denies vaginal bleeding. She denies vaginal discharge. She denies leaking of fluid. She denies uterine cramping. She denies  nausea and/or vomiting. OBJECTIVE:  Blood pressure 114/78, weight 248 lb (112.5 kg). Total weight gain: 24 lb (10.9 kg)    NST:   Baseline:  130  Variability:  Moderate  Accelerations:  Present  Decelerations: Absent   Fetal Movement:  Perceived by patient during NST  Contractions: patient denies contractions, contractions Absent  on toco.  Interpretation: Reactive (Category1)      ASSESSMENT/PLAN:  IUP @ 34+4 weeks  S =D    High Risk Pregnancy   Due date is based on 16w3d ultrasound   Patient's prenatal labs are completed. Patient's blood type O positive and rhogam is NOT indicated in pregnancy.  NIPT ordered by [facility]   Anatomy scan completed. Placenta posterior, normal cord insertion, cervical length not done, no low lying/previa noted. Follow up at 34 weeks.  Glucola between 24-28 weeks. 80 PASS   Fetal Kick Count was discussed and explained. She was instructed to lay on her left side 20 minutes after eating and count movements for up to 2 hours with a target value of 10 movements. Instructed to notify office if she does not make the target.  GBS at 36 weeks  2. Obesity in pregnancy  - [place] FETAL NON-STRESS TEST    3. 34 weeks gestation of pregnancy  - Reviewed signs and symptoms of  labor  - Reviewed birth preferences  - Reviewed signs and symptoms of preeclampsia and ramya hick contractions. She was counseled regarding all of the above:    Return in about 1 week (around 2022) for NST. The patient, Fei Roper was seen with a total time spent of 25 minutes for the visit on this date of service by the [facility]  Both face-to-face (counseling and education) and non face-to-face time (care coordination), were spent in determining the total time component. Electronically Signed By: David Malin, Jennifer Rogers

## 2022-06-29 NOTE — PROGRESS NOTES
Pt is here today at her 34w4 prenatal visit  Pt states fetal movement is present  Pt has no questions or concerns      Manual BP , Pt did not leave a urine

## 2022-06-30 ENCOUNTER — ROUTINE PRENATAL (OUTPATIENT)
Dept: PERINATAL CARE | Age: 27
End: 2022-06-30
Payer: MEDICARE

## 2022-06-30 VITALS
TEMPERATURE: 97.1 F | RESPIRATION RATE: 20 BRPM | WEIGHT: 252 LBS | SYSTOLIC BLOOD PRESSURE: 131 MMHG | HEART RATE: 95 BPM | DIASTOLIC BLOOD PRESSURE: 85 MMHG | HEIGHT: 62 IN | BODY MASS INDEX: 46.38 KG/M2

## 2022-06-30 DIAGNOSIS — O99.891 CURRENT MATERNAL CONDITION AFFECTING PREGNANCY: ICD-10-CM

## 2022-06-30 DIAGNOSIS — O09.33 INSUFFICIENT PRENATAL CARE IN THIRD TRIMESTER: Primary | ICD-10-CM

## 2022-06-30 DIAGNOSIS — D69.1 MATERNAL PLATELET DISORDER AFFECTING PREGNANCY IN THIRD TRIMESTER, ANTEPARTUM (HCC): ICD-10-CM

## 2022-06-30 DIAGNOSIS — O35.2XX0 HEREDITARY FAMILIAL DISEASE AFFECTING MANAGEMENT OF MOTHER AND POSSIBLY AFFECTING FETUS, ANTEPARTUM, SINGLE OR UNSPECIFIED FETUS: ICD-10-CM

## 2022-06-30 DIAGNOSIS — Z13.89 ENCOUNTER FOR ROUTINE SCREENING FOR MALFORMATION USING ULTRASONICS: ICD-10-CM

## 2022-06-30 DIAGNOSIS — O99.113 MATERNAL PLATELET DISORDER AFFECTING PREGNANCY IN THIRD TRIMESTER, ANTEPARTUM (HCC): ICD-10-CM

## 2022-06-30 DIAGNOSIS — O99.213 OBESITY AFFECTING PREGNANCY IN THIRD TRIMESTER: ICD-10-CM

## 2022-06-30 DIAGNOSIS — Z3A.34 34 WEEKS GESTATION OF PREGNANCY: ICD-10-CM

## 2022-06-30 DIAGNOSIS — Z36.4 ANTENATAL SCREENING FOR FETAL GROWTH RETARDATION USING ULTRASONICS: ICD-10-CM

## 2022-06-30 PROCEDURE — 76819 FETAL BIOPHYS PROFIL W/O NST: CPT | Performed by: OBSTETRICS & GYNECOLOGY

## 2022-06-30 PROCEDURE — 76816 OB US FOLLOW-UP PER FETUS: CPT | Performed by: OBSTETRICS & GYNECOLOGY

## 2022-06-30 PROCEDURE — 99999 PR OFFICE/OUTPT VISIT,PROCEDURE ONLY: CPT | Performed by: OBSTETRICS & GYNECOLOGY

## 2022-06-30 NOTE — PROGRESS NOTES
Patient previously opted for non-invasive prenatal testing/NIPT (cell-free DNA screening) that is offered to all pregnant patients irrespective of baseline risk or maternal age (Obstet Gynecol 2020; 80; ACOG Practice Bulletin Number 226, Screening for Fetal Chromosomal Abnormalities). Patient previously opted for non-invasive prenatal diagnosis testing (with the [facility] Complete test from [facility]). Has not had drawn yet. Patient previously declined invasive prenatal diagnostic testing today (including for evaluation and testing for fetal aneuploidy, fetal Fragile X Syndrome/autism panel testing, fetal microdeletions, fetal single gene disorders, fetal microarray testing, etc). Laboratory evaluation for Fragile X Syndrome testing requested (family history of autism/developmental delay) previously. Has not had drawn yet. Patient declines Maternal-Fetal Medicine consultation at this time regarding the medical/obstetrical complications of pregnancy. Maternal-Fetal Medicine (MFM) attending physician will defer all management for the medical/obstetrical complications of pregnancy to the primary attending obstetrical physician/provider, as a result. Therefore, only an ultrasound evaluation was completed today in the MFM office.

## 2022-07-07 ENCOUNTER — HOSPITAL ENCOUNTER (OUTPATIENT)
Age: 27
Setting detail: SPECIMEN
Discharge: HOME | End: 2022-07-07

## 2022-07-07 ENCOUNTER — ROUTINE PRENATAL (OUTPATIENT)
Dept: OBGYN CLINIC | Age: 27
End: 2022-07-07
Payer: MEDICARE

## 2022-07-07 VITALS
WEIGHT: 246.8 LBS | SYSTOLIC BLOOD PRESSURE: 125 MMHG | DIASTOLIC BLOOD PRESSURE: 80 MMHG | HEART RATE: 110 BPM | BODY MASS INDEX: 45.14 KG/M2

## 2022-07-07 DIAGNOSIS — Z36.89 NST (NON-STRESS TEST) REACTIVE ON FETAL SURVEILLANCE: ICD-10-CM

## 2022-07-07 DIAGNOSIS — Z3A.35 35 WEEKS GESTATION OF PREGNANCY: ICD-10-CM

## 2022-07-07 DIAGNOSIS — O99.210 OBESITY IN PREGNANCY: ICD-10-CM

## 2022-07-07 DIAGNOSIS — D69.1 DELTA STORAGE POOL DISEASE (HCC): ICD-10-CM

## 2022-07-07 DIAGNOSIS — O09.90 HIGH RISK PREGNANCY, ANTEPARTUM: Primary | ICD-10-CM

## 2022-07-07 DIAGNOSIS — O09.90 HIGH RISK PREGNANCY, ANTEPARTUM: ICD-10-CM

## 2022-07-07 PROCEDURE — G8417 CALC BMI ABV UP PARAM F/U: HCPCS | Performed by: ADVANCED PRACTICE MIDWIFE

## 2022-07-07 PROCEDURE — G8427 DOCREV CUR MEDS BY ELIG CLIN: HCPCS | Performed by: ADVANCED PRACTICE MIDWIFE

## 2022-07-07 PROCEDURE — 1036F TOBACCO NON-USER: CPT | Performed by: ADVANCED PRACTICE MIDWIFE

## 2022-07-07 PROCEDURE — 59025 FETAL NON-STRESS TEST: CPT | Performed by: ADVANCED PRACTICE MIDWIFE

## 2022-07-07 PROCEDURE — 99214 OFFICE O/P EST MOD 30 MIN: CPT | Performed by: ADVANCED PRACTICE MIDWIFE

## 2022-07-07 NOTE — PROGRESS NOTES
Pt is here today at her 35w5d  Pt states fetal movement is present  Pt has no questions or concerns     Patient was unable to give a urine
antepartum  · Culture, Strep B Screen, Vaginal/Rectal; Future  · [place] FETAL NON-STRESS TEST  · 6/30/22 EFW 5#2 36th percentile (AC 21%), ZARIA 13.32 cm, posterior placenta, cephalic, normal fetal anatomy - f/u with MFM PRN  NST (non-stress test) reactive on fetal surveillance  Obesity in pregnancy (Pregravid BMI 40)  · [place] FETAL NON-STRESS TEST  · Taking ASA  · Continue NST weekly until delivery  Delta storage pool disease [facility])  · States did not follow up with hematology during this delivery  · MFM consult was completed   · Does plan for epidural, previous deliveries received 1 units platelets prior to epidural       The problem list was reviewed and updated with any new issues from today's visit. Return in about 1 week (around 7/14/2022) for NST with midwives. The patient, Gaurang Morales, was seen with a total time spent of 32 minutes for the visit on this date of service by the [facility]  The time component involved both face-to-face (counseling and education) and non face-to-face time (care coordination), spent in determining the total time component.       Electronically signed by: MONI Tucker CNM

## 2022-07-10 LAB
CULTURE: NORMAL
SPECIMEN DESCRIPTION: NORMAL

## 2022-07-13 NOTE — PROGRESS NOTES
SUBJECTIVE:    Jorgito Ma is here for her return OB visit. Doing well no concerns  She reports  feeling fetal movement. She denies vaginal bleeding. She denies vaginal discharge. She denies leaking of fluid. She denies uterine cramping. She denies  nausea and/or vomiting. OBJECTIVE:  Blood pressure 138/80, weight 247 lb (112 kg). Total weight gain: 23 lb (10.4 kg)    NST:   Baseline:  150  Variability:  Moderate  Accelerations:  Absent   Decelerations: Absent   Fetal Movement:  Perceived by patient during NST  Contractions: patient denies contractions, contractions Absent  on toco.  Interpretation: Reactive (Category1)      ASSESSMENT/PLAN:  IUP @ 36+6 weeks  S =D    High Risk Pregnancy  Due date is  confirmed with 16w3d early dating ultrasound  Patient's prenatal labs are completed. Patient's blood type O positive and rhogam is not indicated in pregnancy. Early glucola indicated: yes - 90  Patient declined genetic screening  Anatomy scan completed. Placenta posterior,normal cord insertion: Yes, cervical length not done, no low lying, no previa noted. Follow up at 34 weeks completed 22 EFW 36% f/up as clinically indicated. Glucola between 24-28 weeks. complete 81 Pass  Fetal Kick Count was discussed and explained. She was instructed to lay on her left side 20 minutes after eating and count movements for up to 2 hours with a target value of 10 movements. Instructed to notify office if she does not make the target. GBS ordered and was Negative  Reviewed signs and symptoms of  labor: yes  Reviewed signs and symptoms of labor: NA  Reviewed signs and symptoms of preeclampsia: yes  Reviewed signs and symptoms of ramya hick contractions: yes  Reviewed birth preferences: no    2. Obesity in pregnancy  - [place] FETAL NON-STRESS TEST      She was counseled regarding all of the above:    Return in about 1 week (around 2022) for NST.     The patient, Laurie Sandoval,  was seen with a total time spent of 25 minutes for the visit on this date of service by the [facility]  On this date July 15, 2022,  I have spent greater than 50% of this visit:  [x] reviewing previous notes  [x] reviewing test results  [x] pre-charting  Discussed with patient:  [x] Importance of compliance with treatment plan  [x] Counseling  [] Coordinating care  [x] Documenting     Electronically Signed By: Jennifer Patterson

## 2022-07-15 ENCOUNTER — ROUTINE PRENATAL (OUTPATIENT)
Dept: OBGYN CLINIC | Age: 27
End: 2022-07-15
Payer: MEDICARE

## 2022-07-15 VITALS — WEIGHT: 247 LBS | SYSTOLIC BLOOD PRESSURE: 138 MMHG | BODY MASS INDEX: 45.18 KG/M2 | DIASTOLIC BLOOD PRESSURE: 80 MMHG

## 2022-07-15 DIAGNOSIS — O09.90 HIGH RISK PREGNANCY, ANTEPARTUM: Primary | ICD-10-CM

## 2022-07-15 DIAGNOSIS — O99.210 OBESITY IN PREGNANCY: ICD-10-CM

## 2022-07-15 PROCEDURE — 59025 FETAL NON-STRESS TEST: CPT | Performed by: ADVANCED PRACTICE MIDWIFE

## 2022-07-15 PROCEDURE — 99213 OFFICE O/P EST LOW 20 MIN: CPT | Performed by: ADVANCED PRACTICE MIDWIFE

## 2022-07-15 NOTE — PROGRESS NOTES
Pt is here today at her 36w6 prenatal visit with nst  Pt states fetal movement is present  Pt has no questions or concerns today  Pt did not leave a urine

## 2022-07-25 ENCOUNTER — ROUTINE PRENATAL (OUTPATIENT)
Dept: OBGYN CLINIC | Age: 27
End: 2022-07-25
Payer: MEDICARE

## 2022-07-25 VITALS
HEART RATE: 98 BPM | SYSTOLIC BLOOD PRESSURE: 117 MMHG | WEIGHT: 249.4 LBS | DIASTOLIC BLOOD PRESSURE: 80 MMHG | BODY MASS INDEX: 45.62 KG/M2

## 2022-07-25 DIAGNOSIS — Z3A.38 38 WEEKS GESTATION OF PREGNANCY: Primary | ICD-10-CM

## 2022-07-25 DIAGNOSIS — O09.90 HIGH RISK PREGNANCY, ANTEPARTUM: ICD-10-CM

## 2022-07-25 DIAGNOSIS — O99.210 OBESITY IN PREGNANCY: ICD-10-CM

## 2022-07-25 PROCEDURE — 59025 FETAL NON-STRESS TEST: CPT | Performed by: ADVANCED PRACTICE MIDWIFE

## 2022-07-25 PROCEDURE — G8427 DOCREV CUR MEDS BY ELIG CLIN: HCPCS | Performed by: ADVANCED PRACTICE MIDWIFE

## 2022-07-25 PROCEDURE — 1036F TOBACCO NON-USER: CPT | Performed by: ADVANCED PRACTICE MIDWIFE

## 2022-07-25 PROCEDURE — 99213 OFFICE O/P EST LOW 20 MIN: CPT | Performed by: ADVANCED PRACTICE MIDWIFE

## 2022-07-25 PROCEDURE — G8417 CALC BMI ABV UP PARAM F/U: HCPCS | Performed by: ADVANCED PRACTICE MIDWIFE

## 2022-07-25 NOTE — PROGRESS NOTES
SUBJECTIVE:    Malaika Perez is here for her routine OB visit. She reports  fetal movement. She denies  vaginal bleeding. She denies  leaking of fluid. She denies  vaginal discharge. She denies  uterine contraction activity. She denies  nausea and/or vomiting. She denies retaining fluid in her extremities. Requesting iOL at 39 weeks. OBJECTIVE:   Blood pressure 117/80, pulse 98, weight 249 lb 6.4 oz (113.1 kg). 0 1 2 3 Patient    Dilation Closed 1-2 3-4 5-6 1    Effacement 0-30 40-50 60-70 >80 1    Station -3 -2 -1/0 +1/+2 1    Consistency Firm Med Soft  1    Position Post Mid Ant  1   TOTAL        5           ASSESSMENT/PLAN:  1. 38 weeks gestation of pregnancy      2. High risk pregnancy, antepartum    - 97720 - [place] FETAL NON-STRESS TEST    3. Obesity in pregnancy    - 43297 - [place] FETAL NON-STRESS TEST    The problem list was reviewed and updated as needed. Malaika Perez will monitor for fetal movements daily    GBS protocol and testing was not discussed. GBS culture was obtained. Results were reviewed. Signs of labor to report were discussed. Birth preferences were discussed. Post-dates testing and protocol were not discussed  Malaika Perez was not counseled regarding Post Partum Depression. She has decided on contraceptive choice. Malaika Perez was counseled regarding all of the above    The patient, Celeste Leonard,  was seen with a total time spent of 20 minutes for the visit on this date of service by the [facility]  The time component, involved both face-to-face (counseling and education)  and non face-to-face time (care coordination), spent in determining the total time component.

## 2022-07-31 ENCOUNTER — HOSPITAL ENCOUNTER (INPATIENT)
Age: 27
LOS: 3 days | Discharge: HOME | DRG: 560 | End: 2022-08-03
Attending: OBSTETRICS & GYNECOLOGY | Admitting: OBSTETRICS & GYNECOLOGY
Payer: MEDICARE

## 2022-07-31 ENCOUNTER — APPOINTMENT (OUTPATIENT)
Dept: LABOR AND DELIVERY | Age: 27
DRG: 560 | End: 2022-07-31
Payer: MEDICARE

## 2022-07-31 PROBLEM — O09.93 HRP (HIGH RISK PREGNANCY), THIRD TRIMESTER: Status: ACTIVE | Noted: 2022-07-31

## 2022-07-31 LAB
ABO/RH: NORMAL
ABSOLUTE EOS #: 0.08 K/UL (ref 0–0.44)
ABSOLUTE IMMATURE GRANULOCYTE: 0.1 K/UL (ref 0–0.3)
ABSOLUTE LYMPH #: 1.99 K/UL (ref 1.1–3.7)
ABSOLUTE MONO #: 0.91 K/UL (ref 0.1–1.2)
AMPHETAMINE SCREEN URINE: NEGATIVE
ANTIBODY SCREEN: NEGATIVE
ARM BAND NUMBER: NORMAL
BARBITURATE SCREEN URINE: NEGATIVE
BASOPHILS # BLD: 0 % (ref 0–2)
BASOPHILS ABSOLUTE: 0.04 K/UL (ref 0–0.2)
BENZODIAZEPINE SCREEN, URINE: NEGATIVE
CANNABINOID SCREEN URINE: NEGATIVE
COCAINE METABOLITE, URINE: NEGATIVE
COLLAGEN ADENOSINE-5'-DIPHOSPHATE (ADP) TIME: 99 SEC (ref 67–112)
COLLAGEN EPINEPHRINE TIME: 101 SEC (ref 85–172)
EOSINOPHILS RELATIVE PERCENT: 1 % (ref 1–4)
EXPIRATION DATE: NORMAL
FENTANYL URINE: NEGATIVE
HCT VFR BLD CALC: 34.5 % (ref 36.3–47.1)
HEMOGLOBIN: 11.3 G/DL (ref 11.9–15.1)
IMMATURE GRANULOCYTES: 1 %
LYMPHOCYTES # BLD: 14 % (ref 24–43)
MCH RBC QN AUTO: 30.8 PG (ref 25.2–33.5)
MCHC RBC AUTO-ENTMCNC: 32.8 G/DL (ref 28.4–34.8)
MCV RBC AUTO: 94 FL (ref 82.6–102.9)
METHADONE SCREEN, URINE: NEGATIVE
MONOCYTES # BLD: 6 % (ref 3–12)
NRBC AUTOMATED: 0 PER 100 WBC
OPIATES, URINE: NEGATIVE
OXYCODONE SCREEN URINE: NEGATIVE
PDW BLD-RTO: 13.3 % (ref 11.8–14.4)
PHENCYCLIDINE, URINE: NEGATIVE
PLATELET # BLD: 360 K/UL (ref 138–453)
PLATELET FUNCTION INTERP: NORMAL
PMV BLD AUTO: 9.7 FL (ref 8.1–13.5)
RBC # BLD: 3.67 M/UL (ref 3.95–5.11)
SEG NEUTROPHILS: 78 % (ref 36–65)
SEGMENTED NEUTROPHILS ABSOLUTE COUNT: 11.39 K/UL (ref 1.5–8.1)
T. PALLIDUM, IGG: NONREACTIVE
TEST INFORMATION: NORMAL
WBC # BLD: 14.5 K/UL (ref 3.5–11.3)

## 2022-07-31 PROCEDURE — 85025 COMPLETE CBC W/AUTO DIFF WBC: CPT

## 2022-07-31 PROCEDURE — 86900 BLOOD TYPING SEROLOGIC ABO: CPT

## 2022-07-31 PROCEDURE — 2580000003 HC RX 258

## 2022-07-31 PROCEDURE — 86780 TREPONEMA PALLIDUM: CPT

## 2022-07-31 PROCEDURE — 1220000000 HC SEMI PRIVATE OB R&B

## 2022-07-31 PROCEDURE — 86850 RBC ANTIBODY SCREEN: CPT

## 2022-07-31 PROCEDURE — 80307 DRUG TEST PRSMV CHEM ANLYZR: CPT

## 2022-07-31 PROCEDURE — 86901 BLOOD TYPING SEROLOGIC RH(D): CPT

## 2022-07-31 PROCEDURE — 85576 BLOOD PLATELET AGGREGATION: CPT

## 2022-07-31 RX ORDER — SODIUM CHLORIDE 0.9 % (FLUSH) 0.9 %
5-40 SYRINGE (ML) INJECTION PRN
Status: DISCONTINUED | OUTPATIENT
Start: 2022-07-31 | End: 2022-08-02

## 2022-07-31 RX ORDER — ACETAMINOPHEN 500 MG
1000 TABLET ORAL EVERY 6 HOURS PRN
Status: DISCONTINUED | OUTPATIENT
Start: 2022-07-31 | End: 2022-08-02

## 2022-07-31 RX ORDER — MISOPROSTOL 100 UG/1
800 TABLET ORAL PRN
Status: DISCONTINUED | OUTPATIENT
Start: 2022-07-31 | End: 2022-08-02

## 2022-07-31 RX ORDER — SODIUM CHLORIDE, SODIUM LACTATE, POTASSIUM CHLORIDE, AND CALCIUM CHLORIDE .6; .31; .03; .02 G/100ML; G/100ML; G/100ML; G/100ML
500 INJECTION, SOLUTION INTRAVENOUS PRN
Status: DISCONTINUED | OUTPATIENT
Start: 2022-07-31 | End: 2022-08-02 | Stop reason: HOSPADM

## 2022-07-31 RX ORDER — TRANEXAMIC ACID 10 MG/ML
1000 INJECTION, SOLUTION INTRAVENOUS
Status: ACTIVE | OUTPATIENT
Start: 2022-07-31 | End: 2022-07-31

## 2022-07-31 RX ORDER — ONDANSETRON 2 MG/ML
4 INJECTION INTRAMUSCULAR; INTRAVENOUS EVERY 6 HOURS PRN
Status: DISCONTINUED | OUTPATIENT
Start: 2022-07-31 | End: 2022-08-02

## 2022-07-31 RX ORDER — CARBOPROST TROMETHAMINE 250 UG/ML
250 INJECTION, SOLUTION INTRAMUSCULAR PRN
Status: DISCONTINUED | OUTPATIENT
Start: 2022-07-31 | End: 2022-08-02

## 2022-07-31 RX ORDER — SODIUM CHLORIDE 0.9 % (FLUSH) 0.9 %
5-40 SYRINGE (ML) INJECTION EVERY 12 HOURS SCHEDULED
Status: DISCONTINUED | OUTPATIENT
Start: 2022-07-31 | End: 2022-08-02

## 2022-07-31 RX ORDER — SODIUM CHLORIDE, SODIUM LACTATE, POTASSIUM CHLORIDE, CALCIUM CHLORIDE 600; 310; 30; 20 MG/100ML; MG/100ML; MG/100ML; MG/100ML
INJECTION, SOLUTION INTRAVENOUS CONTINUOUS
Status: DISCONTINUED | OUTPATIENT
Start: 2022-07-31 | End: 2022-08-02

## 2022-07-31 RX ORDER — SODIUM CHLORIDE 9 MG/ML
25 INJECTION, SOLUTION INTRAVENOUS PRN
Status: DISCONTINUED | OUTPATIENT
Start: 2022-07-31 | End: 2022-08-02

## 2022-07-31 RX ORDER — SODIUM CHLORIDE, SODIUM LACTATE, POTASSIUM CHLORIDE, AND CALCIUM CHLORIDE .6; .31; .03; .02 G/100ML; G/100ML; G/100ML; G/100ML
1000 INJECTION, SOLUTION INTRAVENOUS PRN
Status: DISCONTINUED | OUTPATIENT
Start: 2022-07-31 | End: 2022-08-02 | Stop reason: HOSPADM

## 2022-07-31 RX ORDER — DIPHENHYDRAMINE HCL 25 MG
25 TABLET ORAL EVERY 4 HOURS PRN
Status: DISCONTINUED | OUTPATIENT
Start: 2022-07-31 | End: 2022-08-02

## 2022-07-31 RX ORDER — METHYLERGONOVINE MALEATE 0.2 MG/ML
200 INJECTION INTRAVENOUS PRN
Status: DISCONTINUED | OUTPATIENT
Start: 2022-07-31 | End: 2022-08-02

## 2022-07-31 RX ADMIN — SODIUM CHLORIDE, PRESERVATIVE FREE 10 ML: 5 INJECTION INTRAVENOUS at 22:50

## 2022-07-31 NOTE — H&P
[facility]    Date: 2022       Time: 7:47 PM   Patient Name: Roselia Lacey     Patient : 1995  Room/Bed: 0096/0682-71    Admission Date/Time: 2022  7:22 PM      CC: Risk Reducing Induction of Labor     HPI: Roselia Lacey is a 24 [facility] 39[facility] who presents for Risk Reducing Induction of Labor. Patient denies any fever, chills, N/V, headaches, vision changes, chest pain, shortness of breath, RUQ pain, abdominal pain, and increased swelling/tenderness in bilateral lower extremities. Patient denies any vaginal discharge and any urinary complaints. The patient reports fetal movement is present, denies contractions, denies loss of fluid, denies vaginal bleeding. DATING:  LMP: No LMP recorded (within days). Patient is pregnant.   Estimated Date of Delivery: 22   Based on: Ultrasound, at 16 3/7 weeks GA    PREGNANCY RISK FACTORS:  Patient Active Problem List   Diagnosis    Neurologic cardiac syncope    Dysautonomia ([address]    Murmur    Asperger's syndrome    Delta storage pool disease ([address]    High risk pregnancy, antepartum    Obesity in pregnancy (Pregravid BMI 40)    Susceptible to varicella (non-immune), currently pregnant    Insufficient prenatal care in first trimester    Rh+/RI/GBSneg        Steroids Given In This Pregnancy:  no     REVIEW OF SYSTEMS:  Constitutional: negative fever, negative chills  HEENT: negative visual disturbances, negative headaches  Respiratory: negative dyspnea, negative cough  Cardiovascular: negative chest pain,  negative palpitations  Gastrointestinal: negative abdominal pain, negative RUQ pain, negative N/V, negative diarrhea, negative constipation  Genitourinary: negative dysuria, negative vaginal discharge, negative vaginal bleeding  Dermatological: negative rash  Hematologic: negative bruising  Immunologic/Lymphatic: negative recent illness, negative recent sick contact  Musculoskeletal: negative back pain, negative myalgias, negative arthralgias  Neurological:  negative dizziness, negative weakness  Behavior/Psych: negative depression, negative anxiety    OBSTETRIC HISTORY:   OB History    Para Term  AB Living   3 2 2 0 0 2   SAB IAB Ectopic Molar Multiple Live Births   0 0 0 0 0 2      # Outcome Date GA Lbr Torey/2nd Weight Sex Delivery Anes PTL Lv   3 Current            2 Term 18 40w4d 08:32 / 01:59 7 lb (3.175 kg) F Vag-Spont EPI N RAYNA      Name: Jerri Adan: 9  Apgar5: 9   1 Term 01/15/16 39w1d  6 lb 5.6 oz (2.88 kg) M Vag-Spont EPI N RAYNA      Complications: Vacuum extractor delivery, delivered       PAST MEDICAL HISTORY:   has a past medical history of Asperger's syndrome, Delta storage pool disease [facility]), Murmur, and Neurologic cardiac syncope. PAST SURGICAL HISTORY:   has a past surgical history that includes Tympanostomy tube placement and Tonsillectomy. ALLERGIES:  is allergic to loracarbef. MEDICATIONS:  Prior to Admission medications    Medication Sig Start Date End Date Taking? Authorizing Provider   aspirin (ASPIRIN CHILDRENS) 81 MG chewable tablet Take 1 tablet by mouth daily 3/8/22   MONI Frost CNM   Prenatal Vit-Fe Fumarate-FA (PRENATAL VITAMIN) 28-0.8 MG TABS tablet Take 1 tablet by mouth daily 22   MONI Frost CNM       FAMILY HISTORY:  family history includes Cancer in her mother; Diabetes in her paternal grandfather. SOCIAL HISTORY:   reports that she is a non-smoker but has been exposed to tobacco smoke. She has never used smokeless tobacco. She reports that she does not currently use alcohol. She reports that she does not use drugs.     VITALS:  Vitals:    22   BP: 133/76   Pulse: 100   Resp: 21   Temp: 98.5 °F (36.9 °C)   TempSrc: Oral   SpO2: 97%         PHYSICAL EXAM:  Fetal Heart Monitor:  Baseline Heart Rate 125, moderate variability, present accelerations, absent decelerations  [place]: contractions present and irregular     General appearance:  no apparent distress, alert and cooperative  HEENT: head atraumatic, normocephalic, trachea midline, moist mucous membranes    Neurologic:  oriented, normal speech, no focal findings or movement disorder noted  Lungs:  no increased work of breathing, good air exchange. No use of accessory muscle, no cyanosis. Heart:  regular rate, no pitting edema, no cyanosis  Abdomen:  soft, gravid, non-tender on palpation, no right upper quadrant tenderness, uterus non-tender, no signs of abruption and no signs of chorioamnionitis  Extremities:  no calf tenderness bilaterally, non-edematous bilaterally   Musculoskeletal: no gross abnormalities, range of motion appropriate for age   Psychiatric: mood appropriate, normal affect   Rectal Exam: not indicated  Sterile Vaginal Exam: Deferred per [address]:  Deferred per midwife    PRENATAL LAB RESULTS:  Blood Type/Rh: O pos  Antibody Screen: negative  Hemoglobin, Hematocrit, Platelets: 01.6/86.9/243  Rubella: immune  T.  Pallidum, IgG: non-reactive  Hepatitis B Surface Antigen: non-reactive   Hepatitis C Antibody: non-reactive   HIV: non-reactive   Gonorrhea: negative  Chlamydia: negative  Urine culture: negative, date: 22    Early 1 hour Glucose Tolerance Test:  90  1 hour Glucose Tolerance Test:  81    Group B Strep: negative on 22  Cystic Fibrosis Screen: negative (care everywhere )  Sickle Cell Screen: negative (care everywhere )  First Trimester Screen: not done  QUAD screen: not done  msAFP: not done  Non-Invasive Prenatal Testing: Ordered but not completed  Anatomy US: posterior placenta, 3VC, female gender, normal anatomy    ASSESSMENT & PLAN:  Ibeth Taylor is a 24 y.o. female  at 36w3d presenting for Risk Reducing Induction of Labor   - GBS negative / Rh positive / R immune   - No indication for GBS prophylaxis   - admit to L&D under service of Dr. Micheline Durbin   - CBC, T&S, KARTHIKpal, UA w/ reflex, UDS pending    - IV fluids: 125 LR @ 125 cc/hr   - CEFM/ TOCO   - Diet: Regular  - UDS ordered, Informed consent obtained. Discussed R/B/A. All questions and concerns answered. Patient verbalized understanding and agreement to plan. - Due to patient's history of Delta Storage Pool Disease, and not following with hematology, anesthesia would like hematology consult and recommendations prior to patient being able to receive epidural. Because of patient's history of two prior vaginal deliveries, induction will be held off until hematology consult in morning. Platelet function test ordered. Delta storage pool disease    - Follows with hematology at [address] [place] [address] Last visit 2019   - [facility] referral sent   - Patient currently asymptomatic, hemoglobin wnl      Varicella non-immune   - Offer Varicella vaccine postpartum      Asperger's syndrome   - Patient declined carried screening for fragile X per [facility]      Insufficient prenatal care in first trimester   - Recommend social work postpartum      Neurocardiogenic Syncope   - Patient previously on Florinef outside of pregnancy.  Patient has not required medications during pregnancy   -Patient asymptomatic at this time      BMI 45.62        Patient Active Problem List    Diagnosis Date Noted    Rh+/RI/GBSneg 07/31/2022     Priority: Medium    Insufficient prenatal care in first trimester 03/08/2022     Reports had car issues, denies any SDH needs      Obesity in pregnancy (Pregravid BMI 40) 01/26/2022     Early glucola: 90  ASA:  RX sent  NSTs weekly at 34 weeks: initiated       Susceptible to varicella (non-immune), currently pregnant 01/26/2022     Offer vaccine PP      High risk pregnancy, antepartum 01/25/2022     Genetic screening: too late for 1st tri screen  AFP: declined 3/8/2022   CF/SMA/FragileX: CF negative last pregnancy  Chart Review W/S: Lisa Dillard MD 06/01/22        SOCIAL SERVICE AT DELIVERY:      Asperger's syndrome 10/01/2015 Delta storage pool disease [facility]) 10/01/2015     MFM referral sent      Followed by [address] [facility], last visit 2019:  Previous deliveries has received 1 unit Platelets prior to Spinal/Epidural & [place] for [facility] (as needed)      Murmur 10/23/2013     Already in chart; pt unaware  Not noted on exam  Instructed to report symptoms       Dysautonomia ([address] 10/25/2012    Neurologic cardiac syncope      Hx florinef use  No issues in pregnancy so far         Plan discussed with Dr. Enriqueta Guevara, who is agreeable. Steroids given this admission: No    Risks, benefits, alternatives and possible complications have been discussed in detail with the patient. Admission, and post admission procedures and expectations were discussed in detail. All questions were answered. Attending's Name: Dr. Mallory Hughes MD  Ob/Gyn Resident  7/31/2022, 7:47 PM    Resident Physician Statement  I have discussed the case, including pertinent history and exam findings with the above resident. I have personally seen the patient. I agree with the assessment, plan and orders as documented. I have made changes to the above note as needed. I have discussed the case with above named attending.        Marce Garcia DO  OB/GYN Resident PGY 4  8/1/2022  1:29 AM

## 2022-08-01 ENCOUNTER — ANESTHESIA EVENT (OUTPATIENT)
Dept: LABOR AND DELIVERY | Age: 27
DRG: 560 | End: 2022-08-01
Payer: MEDICARE

## 2022-08-01 ENCOUNTER — ANESTHESIA (OUTPATIENT)
Dept: LABOR AND DELIVERY | Age: 27
DRG: 560 | End: 2022-08-01
Payer: MEDICARE

## 2022-08-01 PROCEDURE — 6360000002 HC RX W HCPCS: Performed by: ANESTHESIOLOGY

## 2022-08-01 PROCEDURE — 2500000003 HC RX 250 WO HCPCS: Performed by: NURSE ANESTHETIST, CERTIFIED REGISTERED

## 2022-08-01 PROCEDURE — 3700000025 EPIDURAL BLOCK: Performed by: ANESTHESIOLOGY

## 2022-08-01 PROCEDURE — 6360000002 HC RX W HCPCS: Performed by: STUDENT IN AN ORGANIZED HEALTH CARE EDUCATION/TRAINING PROGRAM

## 2022-08-01 PROCEDURE — 10907ZC DRAINAGE OF AMNIOTIC FLUID, THERAPEUTIC FROM PRODUCTS OF CONCEPTION, VIA NATURAL OR ARTIFICIAL OPENING: ICD-10-PCS | Performed by: OBSTETRICS & GYNECOLOGY

## 2022-08-01 PROCEDURE — 36430 TRANSFUSION BLD/BLD COMPNT: CPT

## 2022-08-01 PROCEDURE — P9073 PLATELETS PHERESIS PATH REDU: HCPCS

## 2022-08-01 PROCEDURE — 6370000000 HC RX 637 (ALT 250 FOR IP): Performed by: ADVANCED PRACTICE MIDWIFE

## 2022-08-01 PROCEDURE — 2580000003 HC RX 258

## 2022-08-01 PROCEDURE — 2580000003 HC RX 258: Performed by: STUDENT IN AN ORGANIZED HEALTH CARE EDUCATION/TRAINING PROGRAM

## 2022-08-01 PROCEDURE — 1220000000 HC SEMI PRIVATE OB R&B

## 2022-08-01 PROCEDURE — 6370000000 HC RX 637 (ALT 250 FOR IP)

## 2022-08-01 PROCEDURE — 3E0P7VZ INTRODUCTION OF HORMONE INTO FEMALE REPRODUCTIVE, VIA NATURAL OR ARTIFICIAL OPENING: ICD-10-PCS | Performed by: OBSTETRICS & GYNECOLOGY

## 2022-08-01 PROCEDURE — 99254 IP/OBS CNSLTJ NEW/EST MOD 60: CPT | Performed by: INTERNAL MEDICINE

## 2022-08-01 RX ORDER — NALOXONE HYDROCHLORIDE 0.4 MG/ML
INJECTION, SOLUTION INTRAMUSCULAR; INTRAVENOUS; SUBCUTANEOUS PRN
Status: DISCONTINUED | OUTPATIENT
Start: 2022-08-01 | End: 2022-08-02

## 2022-08-01 RX ORDER — LIDOCAINE HYDROCHLORIDE AND EPINEPHRINE 15; 5 MG/ML; UG/ML
INJECTION, SOLUTION EPIDURAL PRN
Status: DISCONTINUED | OUTPATIENT
Start: 2022-08-01 | End: 2022-08-02 | Stop reason: SDUPTHER

## 2022-08-01 RX ORDER — ONDANSETRON 2 MG/ML
4 INJECTION INTRAMUSCULAR; INTRAVENOUS EVERY 6 HOURS PRN
Status: DISCONTINUED | OUTPATIENT
Start: 2022-08-01 | End: 2022-08-02

## 2022-08-01 RX ORDER — ROPIVACAINE HYDROCHLORIDE 2 MG/ML
INJECTION, SOLUTION EPIDURAL; INFILTRATION; PERINEURAL
Status: COMPLETED
Start: 2022-08-01 | End: 2022-08-01

## 2022-08-01 RX ORDER — NALBUPHINE HCL 10 MG/ML
5 AMPUL (ML) INJECTION EVERY 4 HOURS PRN
Status: DISCONTINUED | OUTPATIENT
Start: 2022-08-01 | End: 2022-08-02

## 2022-08-01 RX ORDER — SODIUM CHLORIDE 9 MG/ML
INJECTION, SOLUTION INTRAVENOUS PRN
Status: DISCONTINUED | OUTPATIENT
Start: 2022-08-01 | End: 2022-08-02

## 2022-08-01 RX ADMIN — Medication 25 MCG: at 12:59

## 2022-08-01 RX ADMIN — DESMOPRESSIN ACETATE 33.88 MCG: 4 SOLUTION INTRAVENOUS at 22:59

## 2022-08-01 RX ADMIN — Medication 25 MCG: at 17:36

## 2022-08-01 RX ADMIN — SODIUM CHLORIDE, POTASSIUM CHLORIDE, SODIUM LACTATE AND CALCIUM CHLORIDE: 600; 310; 30; 20 INJECTION, SOLUTION INTRAVENOUS at 19:25

## 2022-08-01 RX ADMIN — LIDOCAINE HYDROCHLORIDE,EPINEPHRINE BITARTRATE 5 ML: 15; .005 INJECTION, SOLUTION EPIDURAL; INFILTRATION; INTRACAUDAL; PERINEURAL at 23:53

## 2022-08-01 RX ADMIN — ROPIVACAINE HYDROCHLORIDE 10 ML/HR: 2 INJECTION, SOLUTION EPIDURAL; INFILTRATION at 23:55

## 2022-08-01 RX ADMIN — SODIUM CHLORIDE, POTASSIUM CHLORIDE, SODIUM LACTATE AND CALCIUM CHLORIDE: 600; 310; 30; 20 INJECTION, SOLUTION INTRAVENOUS at 12:29

## 2022-08-01 RX ADMIN — SODIUM CHLORIDE, PRESERVATIVE FREE 10 ML: 5 INJECTION INTRAVENOUS at 11:05

## 2022-08-01 NOTE — PROGRESS NOTES
Obstetric/Gynecology Resident Interval Note    Discussed case with Heme/Onc attending Dr. Niki Bui. Patient has history of Delta Storage Pool Disease. Per Hematology, Patient will require 1U Platelets and DDAVP 1.8ZU/AT IV prior to Epidural or Spinal placement. Can repeat as needed for excessive bleeding. Will prepare platelets and order PRN DDAVP for when patient becomes more uncomfortable.     Owen Cons, DO  OB/GYN Resident, [facility]  8/1/2022, 1:13 PM

## 2022-08-01 NOTE — PROGRESS NOTES
Labor Progress Note    Gary Mac is a 24 y.o. female  at 44w2d  The patient was seen and examined. She has no complaints at this time. Her pain is well controlled. She reports fetal movement is present, denies contractions, denies loss of fluid, denies vaginal bleeding. Vital Signs:  Vitals:    22 2212   BP: 133/76 134/75   Pulse: 100 89   Resp: 21 18   Temp: 98.5 °F (36.9 °C) 97.7 °F (36.5 °C)   TempSrc: Oral Oral   SpO2: 97%          FHT: Refer to prior tracing  Contractions: Present and irregular        Assessment/Plan:  Gary Mac is a 24 y.o. female  at 44w2d admitted for Risk Reducing Induction of Labor   - GBS negative, No indication for GBS prophylaxis   - cEFM/TOCO qshift, category 1 tracing   - SVE delayed until tomorrow/beginning of induction    Delta Storage Pool Disease  - IOL will start after hematology consult for delta storage pool deficiency   - Platelet function test are WNL       Attending in agreement with plan    Gabrielle Murrieta MD  Ob/Gyn Resident  2022, 7:24 AM    Resident Physician Statement  I have discussed the case, including pertinent history and exam findings with the above resident. I have personally seen the patient. I agree with the assessment, plan and orders as documented. I have made changes to the above note as needed. I have discussed the case with above named attending.        Sb Barrios DO  OB/GYN Resident PGY 4  2022  7:32 AM

## 2022-08-01 NOTE — CONSULTS
Today's Date: 8/1/2022  Patient Name: Karthik Marina  Date of admission: 7/31/2022  7:22 PM  Patient's age: 24 y.o., 1995  Admission Dx: HRP (high risk pregnancy), third trimester [O09.93]    Reason for Consult: Delta storage pool disorder and planning epidural with induction of labor  Requesting Physician: Dali Andino MD    CHIEF COMPLAINT:    Chief Complaint   Patient presents with    Scheduled Induction       History Obtained From:  patient and chart    HISTORY OF PRESENT ILLNESS:      Karthik Marina is a 24 y.o. female who is admitted to the hospital on 7/31/2022  for scheduled induction of labor. Patient has history of dental surgical disorder and has been following with hematologist at [address] [facility].  During her previous pregnancy she has received platelet transfusion and she has uncomplicated pregnancies without any bleeding. She is planning to have epidural today with plan for induction today and hematology consulted for recommendations. Past Medical History:   has a past medical history of Asperger's syndrome, Delta storage pool disease [facility]), Murmur, and Neurologic cardiac syncope. Past Surgical History:   has a past surgical history that includes Tympanostomy tube placement and Tonsillectomy. Medications:    Prior to Admission medications    Medication Sig Start Date End Date Taking?  Authorizing Provider   aspirin (ASPIRIN CHILDRENS) 81 MG chewable tablet Take 1 tablet by mouth daily 3/8/22   MONI Coyle CNM   Prenatal Vit-Fe Fumarate-FA (PRENATAL VITAMIN) 28-0.8 MG TABS tablet Take 1 tablet by mouth daily 1/25/22   MONI Coyle CNM     Current Facility-Administered Medications   Medication Dose Route Frequency Provider Last Rate Last Admin    desmopressin (DDAVP) 33.88 mcg in sodium chloride 0.9 % 50 mL IVPB  0.3 mcg/kg IntraVENous Once PRN Janeth Margob, DO        0.9 % sodium chloride infusion   IntraVENous PRN Janeth Margob, DO        lactated ringers infusion   IntraVENous Continuous Kathryn Shah  mL/hr at 08/01/22 1237 Rate Verify at 08/01/22 1237    lactated ringers bolus  500 mL IntraVENous PRN Kathryn Shah MD        Or    lactated ringers bolus  1,000 mL IntraVENous PRN Kathryn Shah, MD        sodium chloride flush 0.9 % injection 5-40 mL  5-40 mL IntraVENous 2 times per day Kathryn Shah MD   10 mL at 08/01/22 1105    sodium chloride flush 0.9 % injection 5-40 mL  5-40 mL IntraVENous PRN Kathryn Shah MD        0.9 % sodium chloride infusion  25 mL IntraVENous PRN Elisabethe Alyssa, MD        ondansetron [facility]) injection 4 mg  4 mg IntraVENous Q6H PRN Elisabethe Flatness, MD        diphenhydrAMINE (BENADRYL) tablet 25 mg  25 mg Oral Q4H PRN Allaanne Flatness, MD        methylergonovine (METHERGINE) injection 200 mcg  200 mcg IntraMUSCular PRN Loanne Flatness, MD        carboprost (HEMABATE) injection 250 mcg  250 mcg IntraMUSCular PRN Loanne Flatness, MD        miSOPROStol (CYTOTEC) tablet 800 mcg  800 mcg Rectal PRN Loanne Flatness, MD        acetaminophen (TYLENOL) tablet 1,000 mg  1,000 mg Oral Q6H PRN Elisabethe Flatness, MD        benzocaine-menthol (DERMOPLAST) 20-0.5 % spray   Topical PRN Elisabethe Alyssa, MD           Allergies:  Loracarbef    Social History:   reports that she has been smoking cigarettes. She has been exposed to tobacco smoke. She has never used smokeless tobacco. She reports that she does not currently use alcohol. She reports that she does not use drugs. Family History: family history includes Cancer in her mother; Diabetes in her paternal grandfather. REVIEW OF SYSTEMS:    Constitutional: No fever or chills.  No night sweats, no weight loss   Eyes: No eye discharge, double vision, or eye pain   HEENT: negative for sore mouth, sore throat, hoarseness and voice change   Respiratory: negative for cough , sputum, dyspnea, wheezing, hemoptysis, chest pain   Cardiovascular: negative for chest pain, dyspnea, palpitations, HGB 11.3*   HCT 34.5*        BMP: No results for input(s): NA, K, CO2, BUN, CREATININE, LABGLOM, GLUCOSE in the last 72 hours. PT/INR: No results for input(s): PROTIME, INR in the last 72 hours. IMAGING DATA:  No orders to display       Primary Problem  HRP (high risk pregnancy), third trimester    Active Hospital Problems    Diagnosis Date Noted    Rh+/RI/GBSneg [O09.93] 07/31/2022     Priority: Medium    Insufficient prenatal care in first trimester [O09.31] 03/08/2022    High risk pregnancy, antepartum [O09.90] 01/25/2022    Asperger's syndrome [F84.5] 10/01/2015    Delta storage pool disease [facility]) [D69.1] 10/01/2015         IMPRESSION:   Induction of labor  Delta storage pool disorder    RECOMMENDATIONS:  I reviewed the laboratory data, imaging studies, discuss diagnosis, outside records, prior records and also discussed treatment recommendations  I discussed the care plan with primary team as well as nurse  Recommend DDAVP 0.3 mcg/kg IV 30 minutes prior to the epidural  Also recommend giving 1 unit platelet starting 30 to 60 minutes prior to epidural/induction  Please call for any questions    Discussed with patient and Nurse. Thank you for asking us to see this patient. Abbe Watson MD  Hematologist/Medical Oncologist    Cell: [phone]    This note is created with the assistance of a speech recognition program.  While intending to generate a document that actually reflects the content of the visit, the document can still have some errors including those of syntax and sound a like substitutions which may escape proof reading. It such instances, actual meaning can be extrapolated by contextual diversion.

## 2022-08-01 NOTE — PLAN OF CARE
Problem: Vaginal Birth or  Section  Goal: Fetal and maternal status remain reassuring during the birth process  Description:  Birth OB-Pregnancy care plan goal which identifies if the fetal and maternal status remain reassuring during the birth process  Outcome: Progressing     Problem: Pain  Goal: Verbalizes/displays adequate comfort level or baseline comfort level  Outcome: Progressing     Problem: Safety - Adult  Goal: Free from fall injury  Outcome: Progressing     Problem: Discharge Planning  Goal: Discharge to home or other facility with appropriate resources  Outcome: Progressing

## 2022-08-01 NOTE — CARE COORDINATION
ANTEPARTUM NOTE    HRP (high risk pregnancy), third trimester [O09.93]    [facility] was admitted to L&D on 7/31/2022 for RR IOL @ 44 1/7    OB GYN Provider: [facility]    Will meet with patient after delivery to verify name/address/phone/insurance and discuss discharge planning. Anticipate DC home 2 nights after vaginal delivery or 4 nights after C/S delivery as long as hemodynamically stable. SSKI Counseling:  I discussed with the patient the risks of SSKI including but not limited to thyroid abnormalities, metallic taste, GI upset, fever, headache, acne, arthralgias, paraesthesias, lymphadenopathy, easy bleeding, arrhythmias, and allergic reaction.

## 2022-08-01 NOTE — PROGRESS NOTES
Patient arrives for RR IOL as scheduled. Fetal movement is active. She denies contractions, cramping, leaking of fluid, vaginal bleeding. Here with FOB Mono. 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 Case reviewed with Dr. Angelique Benitez and Dr. Ave Stevens due to anticipated need for platelets prior to epidural placement. Patient reports last two pregnancies she received platelets prior to her epidurals. Dr. Ave Stevens reports hematology needs to be consulted before he would be able to place an epidural and given she is multiparous it is recommended to delay starting her induction until we get their recommendations in place. Discussed Dr. Marek Quiles recommendations with Dr. Angelique Benitez and she is agreeable that induction needs to be delayed, staffing allows for patient to stay overnight and await hematology consult recommendations before starting induction of labor. INT in place and labs sent including platelet function testing. Regular diet initiated. Category I tracing since admission, can initiate qshift monitoring until induction of labor initiated, RN aware. Maria L Barba and Mono DIOP agreeable with plan of care. SVE and BSUS delayed until tomorrow when induction to be initiated.    Dr. Kristie Elkins to place hematology referral.

## 2022-08-01 NOTE — PROGRESS NOTES
[facility] Labor Note    SUBJECTIVE:    Patient is working well with induction. States is starting to feel some cramping/noticing when contractions are happening but tolerating well. Happy induction got started this afternoon. Has no reported concerns. OBJECTIVE:     VS:  height is 5' 2\" (1.575 m) and weight is 249 lb (112.9 kg). Her oral temperature is 98.4 °F (36.9 °C). Her blood pressure is 121/78 and her pulse is 85. Her respiration is 18 and oxygen saturation is 97%. FHT:    Baseline: 130 bpm   Variability: moderate              Accels: present   Decels: Absent    Scalp electrode: No    Contraction pattern:                                  Frequency: 3-5 min                                 Duration: 40-60 sec                                 Intensity: mild                                 Pitocin: N/A                                 IUPC:  No    Cervix: deferred    Status of membranes: intact    Pain control: denies needs at this time, plans for epidural when needed    Maternal status (hydration, fatigue, voids): IV/PO fluids continue, voiding QS, does not appear overly fatigued     ASSESSMENT/PLAN:  RR IOL:  S/P Cytotec 25 mcg PO (1300), repeat dose ordered  cEFM/TOCO   mL/hr  VSS  Regular diet  Coping well, continue support   Dr. Rashid Palacios updated   Delta Storage Pool Disorder  Initiating induction was delayed due to need for hematology consult since patient requesting epidural for her labor  Hematology consulted, see their documentation (will need DDAVP and platelets prior to epidural).    FHR Category 1

## 2022-08-01 NOTE — PROGRESS NOTES
Labor Progress Note    Karthik Marina is a 24 y.o. female  at 44w2d presenting for Risk Reducing Induction of Labor  The patient was seen and examined. She has no complaints at this time. Her pain is moderately controlled. She reports fetal movement is present, complains of contractions, denies loss of fluid, denies vaginal bleeding. Vital Signs:  Vitals:    22 1300 22 1549 22 1711 22 1927   BP: 121/78  121/77 124/77   Pulse: 85  80 79   Resp: 18  18 18   Temp: 98.4 °F (36.9 °C)  97.9 °F (36.6 °C) 97.4 °F (36.3 °C)   TempSrc: Oral  Oral Oral   SpO2:    98%   Weight:  249 lb (112.9 kg)     Height:  5' 2\" (1.575 m)       Chaperone for Intimate Exam: Chaperone was present for entire exam, Chaperone Name: Bello Vyas RN  Cervical Exam: 3 cm dilated, 50% effaced, -1 station  Pitocin: @ 0 mu/min    FHT: 130, moderate variability, accelerations present, decelerations absent  Contractions: Irregular every 1 - 4 minutes     Membranes: Intact  Scalp Electrode in place: absent  Intrauterine Pressure Catheter in Place: absent    Interventions: SVE    Assessment/Plan:  Karthik Marina is a 24 y.o. female  at 44w2d admitted for Risk Reducing Induction of Labor   - GBS negative, No indication for GBS prophylaxis   - VSS, Afebrile   - cEFM/TOCO category 1 tracing   - /-1    - S/p Cytotec x2  - Epidural ordered, RN to start infusion of platelets and DDVAP per hematology    - Will start pitocin per protocol      Midwife updated and in agreement with plan   Stan Jarquin MD  Obstetrics & Gynecology Resident, PGY-1  [place], [place]  2022 9:31 PM    Resident Physician Statement  I have discussed the case, including pertinent history and exam findings with the above resident. I have personally seen the patient. I agree with the assessment, plan and orders as documented. I have made changes to the above note as needed. I have discussed the case with above named attending.        Lamar Beckford Quintin [place]  OB/GYN Resident PGY 4  8/1/2022  11:09 PM

## 2022-08-01 NOTE — DISCHARGE SUMMARY
Obstetric Discharge Summary  [facility]    Patient Name: Johnny Rushing  Patient : 1995  Primary Care Physician: Devonte Carrero MD  Admit Date: 2022    Principal Diagnosis: IUP at 39w1d, admitted for Risk Reducing Induction of Labor     Her pregnancy has been complicated by:   Patient Active Problem List   Diagnosis    Neurologic cardiac syncope    Dysautonomia ([address]    Murmur    Asperger's syndrome    Delta storage pool disease [facility])    High risk pregnancy, antepartum    Obesity in pregnancy (Pregravid BMI 40)    Susceptible to varicella (non-immune), currently pregnant    Insufficient prenatal care in first trimester     24 F Apg  Wt 6#15    gHTN (G3)    Postpartum state       Infection Present?: No  Hospital Acquired: N/A    Surgical Operations & Procedures:  Analgesia: epidural  Delivery Type: Spontaneous Vaginal Delivery: See Labor and Delivery Summary   Laceration(s): sulcal tear hemostatic, not requiring repair    Consultations: hematology/oncology and Anesthesia    Pertinent Findings & Procedures:   Johnny Rushing is a 24 y.o. female  at 39w1d admitted for Risk Reducing Induction of Labor; received Cytotec 25 mcg PO x2, Epidural, Pitocin, AROM (clr). Heme/Onc was consulted due to history of delta storage pool disease and they recommended DDVAP and Plt prior to epidural.    She delivered by spontaneous vaginal a Live Born infant on 22. Patient received an additional dose of DDVAP prior to epidural catheter removal.     Information for the patient's :  Sundra Pauling Girl Matheus oLpez [[id number]]   female   Birth Weight: 6 lb 15.3 oz (3.155 kg)     Apgars: 9 at 1 minute and 9 at 5 minutes. Postpartum course: normal.    PPD#0: Hgb 9.8, Rx for iron supplementation sent. PPD#1: Patient met criteria for gestational hypertension. PreE labs wnl, P/C 0.14    Course of patient: complicated by gestational hypertension.     Discharge to: Home    Readmission planned: no     Recommendations on Discharge:     Medications:      Medication List        START taking these medications      ferrous sulfate 325 (65 Fe) MG tablet  Commonly known as: IRON 325  Take 1 tablet by mouth in the morning and 1 tablet before bedtime. ibuprofen 600 MG tablet  Commonly known as: ADVIL;MOTRIN  Take 1 tablet by mouth every 6 hours as needed for Pain     sennosides-docusate sodium 8.6-50 MG tablet  Commonly known as: SENOKOT-S  Take 1 tablet by mouth in the morning. CONTINUE taking these medications      prenatal vitamin 28-0.8 MG Tabs tablet  Take 1 tablet by mouth daily            STOP taking these medications      aspirin 81 MG chewable tablet  Commonly known as: Aspirin Childrens               Where to Get Your Medications        These medications were sent to [facility] [address] [facility], [address], [address]  [address]      Phone: [phone]   ferrous sulfate 325 (65 Fe) MG tablet  ibuprofen 600 MG tablet  sennosides-docusate sodium 8.6-50 MG tablet       Activity: pelvic rest x 6 weeks, no lifting greater than 15 lbs  Diet: regular diet  Follow up: 1 weeks for  post partum visit  and BP check    Condition on discharge: stable    Discharge date: 8/3/22    Magen Conner DO  Ob/Gyn Resident    Comments:  Home care and follow-up care were reviewed. Pelvic rest, and birth control were reviewed. Signs and symptoms of mastitis and post partum depression were reviewed. The patient is to notify her physician if any of these occur. The patient was counseled on secondary smoke risks and the increased risk of sudden infant death syndrome and respiratory problems to her baby with exposure. She was counseled on various alternate recommendations to decrease the exposure to secondary smoke to her children.

## 2022-08-02 LAB
ABSOLUTE EOS #: <0.03 K/UL (ref 0–0.44)
ABSOLUTE IMMATURE GRANULOCYTE: 0.09 K/UL (ref 0–0.3)
ABSOLUTE LYMPH #: 1.42 K/UL (ref 1.1–3.7)
ABSOLUTE MONO #: 0.73 K/UL (ref 0.1–1.2)
BASOPHILS # BLD: 0 % (ref 0–2)
BASOPHILS ABSOLUTE: 0.04 K/UL (ref 0–0.2)
BLD PROD TYP BPU: NORMAL
BLOOD BANK BLOOD PRODUCT EXPIRATION DATE: NORMAL
BLOOD BANK ISBT PRODUCT BLOOD TYPE: 6200
BLOOD BANK PRODUCT CODE: NORMAL
BLOOD BANK UNIT TYPE AND RH: NORMAL
BPU ID: NORMAL
DISPENSE STATUS BLOOD BANK: NORMAL
EOSINOPHILS RELATIVE PERCENT: 0 % (ref 1–4)
HCT VFR BLD CALC: 29.6 % (ref 36.3–47.1)
HEMOGLOBIN: 9.8 G/DL (ref 11.9–15.1)
IMMATURE GRANULOCYTES: 1 %
LYMPHOCYTES # BLD: 9 % (ref 24–43)
MCH RBC QN AUTO: 31.4 PG (ref 25.2–33.5)
MCHC RBC AUTO-ENTMCNC: 33.1 G/DL (ref 28.4–34.8)
MCV RBC AUTO: 94.9 FL (ref 82.6–102.9)
MONOCYTES # BLD: 5 % (ref 3–12)
NRBC AUTOMATED: 0 PER 100 WBC
PDW BLD-RTO: 13.3 % (ref 11.8–14.4)
PLATELET # BLD: 280 K/UL (ref 138–453)
PMV BLD AUTO: 9.5 FL (ref 8.1–13.5)
RBC # BLD: 3.12 M/UL (ref 3.95–5.11)
SEG NEUTROPHILS: 85 % (ref 36–65)
SEGMENTED NEUTROPHILS ABSOLUTE COUNT: 12.85 K/UL (ref 1.5–8.1)
TRANSFUSION STATUS: NORMAL
UNIT DIVISION: 0
UNIT ISSUE DATE/TIME: NORMAL
WBC # BLD: 15.1 K/UL (ref 3.5–11.3)

## 2022-08-02 PROCEDURE — 2580000003 HC RX 258

## 2022-08-02 PROCEDURE — 7200000001 HC VAGINAL DELIVERY

## 2022-08-02 PROCEDURE — 6360000002 HC RX W HCPCS: Performed by: STUDENT IN AN ORGANIZED HEALTH CARE EDUCATION/TRAINING PROGRAM

## 2022-08-02 PROCEDURE — 2580000003 HC RX 258: Performed by: STUDENT IN AN ORGANIZED HEALTH CARE EDUCATION/TRAINING PROGRAM

## 2022-08-02 PROCEDURE — 85025 COMPLETE CBC W/AUTO DIFF WBC: CPT

## 2022-08-02 PROCEDURE — 36415 COLL VENOUS BLD VENIPUNCTURE: CPT

## 2022-08-02 PROCEDURE — 59409 OBSTETRICAL CARE: CPT | Performed by: ADVANCED PRACTICE MIDWIFE

## 2022-08-02 PROCEDURE — 6370000000 HC RX 637 (ALT 250 FOR IP)

## 2022-08-02 PROCEDURE — 1220000000 HC SEMI PRIVATE OB R&B

## 2022-08-02 PROCEDURE — 6360000002 HC RX W HCPCS

## 2022-08-02 PROCEDURE — 6360000002 HC RX W HCPCS: Performed by: ANESTHESIOLOGY

## 2022-08-02 RX ORDER — SENNA AND DOCUSATE SODIUM 50; 8.6 MG/1; MG/1
1 TABLET, FILM COATED ORAL DAILY
Qty: 30 TABLET | Refills: 1 | Status: SHIPPED | OUTPATIENT
Start: 2022-08-02

## 2022-08-02 RX ORDER — SODIUM CHLORIDE 0.9 % (FLUSH) 0.9 %
5-40 SYRINGE (ML) INJECTION EVERY 12 HOURS SCHEDULED
Status: DISCONTINUED | OUTPATIENT
Start: 2022-08-02 | End: 2022-08-03 | Stop reason: HOSPADM

## 2022-08-02 RX ORDER — DOCUSATE SODIUM 100 MG/1
100 CAPSULE, LIQUID FILLED ORAL 2 TIMES DAILY
Status: DISCONTINUED | OUTPATIENT
Start: 2022-08-02 | End: 2022-08-03 | Stop reason: HOSPADM

## 2022-08-02 RX ORDER — FERROUS SULFATE 325(65) MG
325 TABLET ORAL 2 TIMES DAILY
Qty: 90 TABLET | Refills: 3 | Status: SHIPPED | OUTPATIENT
Start: 2022-08-02

## 2022-08-02 RX ORDER — SODIUM CHLORIDE 0.9 % (FLUSH) 0.9 %
5-40 SYRINGE (ML) INJECTION PRN
Status: DISCONTINUED | OUTPATIENT
Start: 2022-08-02 | End: 2022-08-03 | Stop reason: HOSPADM

## 2022-08-02 RX ORDER — IBUPROFEN 600 MG/1
600 TABLET ORAL EVERY 6 HOURS PRN
Qty: 40 TABLET | Refills: 0 | Status: SHIPPED | OUTPATIENT
Start: 2022-08-02

## 2022-08-02 RX ORDER — LANOLIN 72 %
OINTMENT (GRAM) TOPICAL PRN
Status: DISCONTINUED | OUTPATIENT
Start: 2022-08-02 | End: 2022-08-03 | Stop reason: HOSPADM

## 2022-08-02 RX ORDER — SODIUM CHLORIDE 9 MG/ML
INJECTION, SOLUTION INTRAVENOUS PRN
Status: DISCONTINUED | OUTPATIENT
Start: 2022-08-02 | End: 2022-08-03 | Stop reason: HOSPADM

## 2022-08-02 RX ORDER — ACETAMINOPHEN 500 MG
1000 TABLET ORAL EVERY 6 HOURS PRN
Status: DISCONTINUED | OUTPATIENT
Start: 2022-08-02 | End: 2022-08-03 | Stop reason: HOSPADM

## 2022-08-02 RX ORDER — IBUPROFEN 600 MG/1
600 TABLET ORAL EVERY 6 HOURS
Status: DISCONTINUED | OUTPATIENT
Start: 2022-08-02 | End: 2022-08-03 | Stop reason: HOSPADM

## 2022-08-02 RX ORDER — LANOLIN ALCOHOL/MO/W.PET/CERES
325 CREAM (GRAM) TOPICAL 2 TIMES DAILY
Qty: 90 TABLET | Refills: 3 | Status: SHIPPED | OUTPATIENT
Start: 2022-08-02 | End: 2022-08-02 | Stop reason: HOSPADM

## 2022-08-02 RX ORDER — ONDANSETRON 4 MG/1
4 TABLET, ORALLY DISINTEGRATING ORAL EVERY 6 HOURS PRN
Status: DISCONTINUED | OUTPATIENT
Start: 2022-08-02 | End: 2022-08-03 | Stop reason: HOSPADM

## 2022-08-02 RX ADMIN — IBUPROFEN 600 MG: 600 TABLET, FILM COATED ORAL at 17:36

## 2022-08-02 RX ADMIN — SODIUM CHLORIDE, PRESERVATIVE FREE 10 ML: 5 INJECTION INTRAVENOUS at 21:04

## 2022-08-02 RX ADMIN — ACETAMINOPHEN 1000 MG: 500 TABLET ORAL at 12:03

## 2022-08-02 RX ADMIN — Medication 1 MILLI-UNITS/MIN: at 02:28

## 2022-08-02 RX ADMIN — IBUPROFEN 600 MG: 600 TABLET, FILM COATED ORAL at 11:08

## 2022-08-02 RX ADMIN — ROPIVACAINE HYDROCHLORIDE 10 ML/HR: 2 INJECTION, SOLUTION EPIDURAL; INFILTRATION at 07:30

## 2022-08-02 RX ADMIN — DESMOPRESSIN ACETATE 33.88 MCG: 4 SOLUTION INTRAVENOUS at 09:47

## 2022-08-02 RX ADMIN — DOCUSATE SODIUM 100 MG: 100 CAPSULE ORAL at 21:01

## 2022-08-02 RX ADMIN — SODIUM CHLORIDE, POTASSIUM CHLORIDE, SODIUM LACTATE AND CALCIUM CHLORIDE: 600; 310; 30; 20 INJECTION, SOLUTION INTRAVENOUS at 06:26

## 2022-08-02 RX ADMIN — Medication 166.7 ML: at 09:13

## 2022-08-02 ASSESSMENT — PAIN DESCRIPTION - ORIENTATION: ORIENTATION: LOWER

## 2022-08-02 ASSESSMENT — PAIN SCALES - GENERAL
PAINLEVEL_OUTOF10: 4
PAINLEVEL_OUTOF10: 3
PAINLEVEL_OUTOF10: 3

## 2022-08-02 ASSESSMENT — PAIN DESCRIPTION - DESCRIPTORS
DESCRIPTORS: CRAMPING
DESCRIPTORS: CRAMPING
DESCRIPTORS: DISCOMFORT;CRAMPING
DESCRIPTORS: CRAMPING

## 2022-08-02 ASSESSMENT — PAIN DESCRIPTION - PAIN TYPE
TYPE: ACUTE PAIN
TYPE: ACUTE PAIN

## 2022-08-02 ASSESSMENT — PAIN - FUNCTIONAL ASSESSMENT
PAIN_FUNCTIONAL_ASSESSMENT: ACTIVITIES ARE NOT PREVENTED
PAIN_FUNCTIONAL_ASSESSMENT: ACTIVITIES ARE NOT PREVENTED

## 2022-08-02 ASSESSMENT — PAIN DESCRIPTION - LOCATION
LOCATION: ABDOMEN;PERINEUM
LOCATION: ABDOMEN
LOCATION: ABDOMEN

## 2022-08-02 NOTE — FLOWSHEET NOTE
Vanesa Ochoa, [address] [facility] at bedside. Epidural procedure explained, risks discussed. Pt verbalizes consent for epidural.   2325 patient positioned for epidural.2326 Time out completed. 2344 catheter placed. 2345 test dose given. Epidural catheter taped and secured per anesthesia. 2350  to low fowlers with left uterine displacement. 2350 loading dose given. 2352 pump initiated. Pt tolerated procedure well.

## 2022-08-02 NOTE — PROGRESS NOTES
[facility] Epidural Labor Note    Subjective:     Epidural was placed with some difficulty. Patient is comfortable with epidural. Not currently feeling contractions. Objective:     VS:  height is 5' 2\" (1.575 m) and weight is 249 lb (112.9 kg). Her oral temperature is 98 °F (36.7 °C). Her blood pressure is 103/57 (abnormal) and her pulse is 84. Her respiration is 18 and oxygen saturation is 97%.      FHT:    Baseline: 130   Variability: moderate   Decels: Absent   Accels: present      Contraction pattern q2min, mod    Cx: deferred    Assessment:   IOL  Normal labor progress  FHR Category 1  DPSD, recieved 1 unit platelets and DDAVP prior to epidural    Plan:   Rest  Continue observation  Anticipate vaginal birth

## 2022-08-02 NOTE — DISCHARGE INSTRUCTIONS
Follow-up with your OB doctor in 2 weeks or as specified by your physician. Please refer to A NEW BEGINNING- YOUR PERSONAL GUIDE TO POSTPARTUM CARE book provided in your room. Please take this book home with you to refer to. For Breastfeeding moms, you can contact our lactation specialist,  with any problems or questions you may have. Phone number [phone]. Feel free to leave voice mail and your call will be returned as soon as possible. DIET  Eat a well balanced diet focusing on foods high in fiber and protein. Drink 8-10 glasses of fluids daily, especially water. To avoid constipation you may take a mild stool softener as recommended by your doctor or midwife. ACTIVITY  Gradually increase your activity. Resume exercise regimen only after advise by your doctor or midwife. Avoid lifting anything heavier than your baby or a gallon of milk for SIX weeks. Avoid driving 1 week for vaginal delivery and 2 weeks for  section, unless otherwise instructed by physician. Rise slowly from a lying to sitting and then a standing position. Climb stairs carefully. Use caution when carrying your baby up and down the stairs. NO SEXUAL Activity for 6 weeks or until advised by your doctor; Nothing in vagina: intercourse, tampons, or douching. Be prepared to discuss family planning at your follow-up OB visit. You may feel tired or have a lack of energy. You may continue your prenatal vitamin to replenish nutrients post delivery. Nap when baby naps to catch up on sleep. May shower, NO tub baths, swimming, or hot tubs. EMOTIONS  You may feel godwin, sad, teary, & overwhelmed for the first 2 weeks postpartum. Contact your OB provider if you feel you may be showing signs of postpartum depression, or have thoughts of harming yourself or your infant. If infant will not stop crying, contact another adult for help or place infant in their crib on their back and take a break.   NEVER shake your infant. BLEEDING  Vaginal bleeding will decrease in amount over the next few weeks. You will notice that as your activity increases, your flow may increase. This is your body's way of telling you, you need take things easier and rest more often. Call your OB/ER if you are saturating one maxi pad in an hour & passing large clots. BREAST CARE  Take medications as recommended by your doctor or midwife for pain  If you develop a warm, red, tender area on your breast or develop a fever contact your lactation consultant. [phone]. For breastfeeding moms:  If you become engorged, feeding may be more difficult or painful for 1-2 days. You may find it helpful to hand express some milk so that the infant can latch on more easily. While breastfeeding, continue to take your prenatal vitamins as directed by your doctor or midwife. Refer to the breastfeeding booklet in the  folder/binder for more information. For any questions or concerns contact a Lactation Consultant. Leave a message and your call will be returned. NAY CARE  Shower daily, perineum with mild soap. Use the nay-bottle until bleeding stops each time you use the restroom. Cleanse your perineum from front to back. If used, stitches will dissolve in 4-6 weeks. You may use a sitz bath or soak in a clean tub with drain open and water running for comfort. Kegel exercises will help restore bladder control. SWELLING  Try to keep your legs elevated when you are sitting. When lying down keep your legs elevated. CALL THE DOCTOR WITH THESE HEALTH CONCERNS OR PROBLEMS  If you have a temp of 100.4or more. If your bleeding has increased and you are saturating a pad in an hour. Your abdomen is tender to touch. You are passing blood clots bigger than the size of a lemon. If you are experiencing extreme weakness or dizziness. If you are having flu-like symptoms such as achy muscles or joints.   There is a foul smell squeeze of warm water from a bottle instead of wiping with toilet paper. Take a sitz bath in warm water several times a day. Wear a good nursing bra. Ease sore and swollen breasts with warm, wet washcloths. If you aren't breastfeeding, use ice rather than heat for breast soreness. Your period may not start for several months if you are breastfeeding. You may bleed more, and longer at first, than you did before you got pregnant. Wait until you are healed (about 4 to 6 weeks) before you have sex. Ask your doctor when it is okay for you to have sex. Try not to travel with your baby for 5 or 6 weeks. If you take a long car trip, make frequent stops to walk around and stretch. Avoid exhaustion  Rest every day. Try to nap when your baby naps. Ask another adult to be with you for a few days after delivery. Plan for  if you have other children. Stay flexible so you can eat at odd hours and sleep when you need to. Both you and your baby are making new schedules. Plan small trips to get out of the house. Change can make you feel less tired. Ask for help with housework, cooking, and shopping. Remind yourself that your job is to care for your baby. Know about help for postpartum depression  \"Baby blues\" are common for the first 1 to 2 weeks after birth. You may cry or feel sad or irritable for no reason. Rest whenever you can. Being tired makes it harder to handle your emotions. Go for walks with your baby. Talk to your partner, friends, and family about your feelings. If your symptoms last for more than a few weeks, or if you feel very depressed, ask your doctor for help. Postpartum depression can be treated. Support groups and counseling can help. Sometimes medicine can also help. Stay healthy  Eat healthy foods so you have more energy. If you breastfeed, avoid drugs. If you quit smoking during pregnancy, try to stay smoke-free.  If you choose to have a drink now and then, have only one drink, and limit the number of occasions that you have a drink. Wait to breastfeed at least 2 hours after you have a drink to reduce the amount of alcohol the baby may get in the milk. Start daily exercise after 4 to 6 weeks, but rest when you feel tired. Learn exercises to tone your belly. Do Kegel exercises to regain strength in your pelvic muscles. You can do these exercises while you stand or sit. Squeeze the same muscles you would use to stop your urine. Your belly and thighs should not move. Hold the squeeze for 3 seconds, and then relax for 3 seconds. Start with 3 seconds. Then add 1 second each week until you are able to squeeze for 10 seconds. Repeat the exercise 10 to 15 times for each session. Do three or more sessions each day. Find a class for you and your baby that has an exercise time. If you had a  birth, give yourself a bit more time before you exercise, and be careful. When should you call for help? Share this information with your partner, family, or a friend. They can help you watch for warning signs. Call 911 anytime you think you may need emergency care. For example, call if:    You have thoughts of harming yourself, your baby, or another person. You passed out (lost consciousness). You have chest pain, are short of breath, or cough up blood. You have a seizure. Call your doctor now or seek immediate medical care if:    You have signs of hemorrhage (too much bleeding), such as:  Heavy vaginal bleeding. This means that you are soaking through one or more pads in an hour. Or you pass blood clots bigger than an egg. Feeling dizzy or lightheaded, or you feel like you may faint. Feeling so tired or weak that you cannot do your usual activities. A fast or irregular heartbeat. New or worse belly pain. You have signs of infection, such as:  A fever. Vaginal discharge that smells bad. New or worse belly pain.      You have symptoms of a blood clot in your leg (called a deep vein thrombosis), such as:  Pain in the calf, back of the knee, thigh, or groin. Redness and swelling in your leg or groin. You have signs of preeclampsia, such as:  Sudden swelling of your face, hands, or feet. New vision problems (such as dimness, blurring, or seeing spots). A severe headache. Watch closely for changes in your health, and be sure to contact your doctor if:    Your vaginal bleeding isn't decreasing. You feel sad, anxious, or hopeless for more than a few days. You are having problems with your breasts or breastfeeding. Where can you learn more? Go to https://ARPUpepiceweb.healthget2play. org and sign in to your Twiigg account. Enter M658 in the [employer] box to learn more about \"After Your Delivery (the Postpartum Period): Care Instructions. \"     If you do not have an account, please click on the \"Sign Up Now\" link. Current as of: February 23, 2022               Content Version: 13.3  © 2006-2022 [employer]. Care instructions adapted under license by [facility] ([facility]). If you have questions about a medical condition or this instruction, always ask your healthcare professional. [place] [address] any warranty or liability for your use of this information. Depression After Childbirth: Care Instructions  Overview     Many women get the \"baby blues\" during the first few days after childbirth. You may lose sleep, feel irritable, and cry easily. You may feel happy one minute and sad the next. Hormone changes are one cause of these emotional changes. Also, the demands of a new baby, along with visits from relatives or other family needs, can add to the stress. The \"baby blues\" often peak around thefourth day. Then they ease up in less than 2 weeks. If your moodiness or anxiety lasts for more than 2 weeks, or if you feel like life is not worth living, you may have postpartum depression. This is different for each person.  Some mothers with serious depression may worry intensely about their infant's well-being. Others may feel distant from their child. Some mothers may even feel that they might harm their baby. Some may have signs ofparanoia, wondering if someone is watching them. Depression is not a sign of weakness. It's a medical condition that requires treatment. Medicine and counseling often work well to reduce depression. Talkto your doctor about taking antidepressant medicine while breastfeeding. Follow-up care is a key part of your treatment and safety. Be sure to make and go to all appointments, and call your doctor if you are having problems. It's also a good idea to know your test results and keep alist of the medicines you take. How do you know if you are depressed? With all the changes in your life, you may not know if you are depressed. Pregnancy sometimes causes changes in how you feel that are similar to thesymptoms of depression. Symptoms of depression include:  Feeling sad or hopeless and losing interest in daily activities. These are the most common symptoms of depression. Sleeping too much or not enough. Feeling tired. You may feel as if you have no energy. Eating too much or too little. Writing or talking about death, such as writing suicide notes or talking about guns, knives, or pills. If you or someone you know talks about suicide, self-harm, or feeling hopeless, get help right away. Call the [address] [place] [address] at 1-800-273-talk (2-834.626.6539) or text HOME to 492461 to access the OB10. Consider saving these numbers in your phone. How can you care for yourself at home? Be safe with medicines. Take your medicines exactly as prescribed. Call your doctor if you think you are having a problem with your medicine. Eat a healthy diet so that you can keep up your energy. Get regular daily exercise, such as walks, to help improve your mood. Get as much sunlight as possible.  Keep your shades and curtains open. Get outside as much as you can. Avoid using alcohol or other substances to feel better. Get as much rest and sleep as possible. Avoid doing too much. Being too tired can increase depression. Play stimulating music throughout your day and soothing music at night. Schedule outings and visits with friends and family. Ask them to call you regularly, so that you don't feel alone. Ask for help with preparing food and other daily tasks. Family and friends are often happy to help with a . Be honest with yourself and those who care about you. Tell them about your struggle. Join a support group of new parents. No one can better understand the challenges of caring for a  than other new parents. If you or someone you know talks about suicide, self-harm, or feeling hopeless, get help right away. Call the [address] [place] [address] at 1-685-034-FMRB (0-808.996.8938) or text HOME to 254316 to access the Crisis Text Line. Consider saving these numbers in your phone. When should you call for help? Call 911 anytime you think you may need emergency care. For example, call if:    You feel you cannot stop from hurting yourself, your baby, or someone else. Call your doctor now or seek immediate medical care if:    You are having trouble caring for yourself or your baby. You hear voices. Watch closely for changes in your health, and be sure to contact your doctor if:    You have problems with your depression medicine. You do not get better as expected. Where can you learn more? Go to https://maricruz.Pacific DataVision. org and sign in to your Scribd account. Enter H566 in the [employer]KOR box to learn more about \"Depression After Childbirth: Care Instructions. \"     If you do not have an account, please click on the \"Sign Up Now\" link. Current as of: 2022               Content Version: 13.3  © 4199-7860 Healthwise, Incorporated. Care instructions adapted under license by [facility] ([facility]). If you have questions about a medical condition or this instruction, always ask your healthcare professional. [address] any warranty or liability for your use of this information. Learning About Preeclampsia After Childbirth  What is preeclampsia? Preeclampsia means that your blood pressure during pregnancy is higher thanusual. You may also have other serious symptoms. Preeclampsia can be dangerous. When it is severe, it can cause seizures (eclampsia) or liver or kidney damage. When it affects the liver, it can cause HELLP syndrome, a blood-clotting and bleeding problem. HELLP can come on quickly and can be deadly. This is why your doctor checks you and your babyoften. Preeclampsia usually occurs after 20 weeks of pregnancy. Most often, it starts near the end of pregnancy and goes away after childbirth. But symptoms may last a few weeks or more and can get worse after delivery. Rarely, symptoms ofpreeclampsia don't show up until days or even weeks after childbirth. What are the symptoms? Mild preeclampsia usually doesn't cause symptoms. But preeclampsia can causerapid weight gain and sudden swelling of the hands and face. Severe preeclampsia does cause symptoms. It can cause a very bad headache and trouble seeing and breathing. It also can cause belly pain. You may alsourinate less than usual.  What can you expect after you have had preeclampsia? In the hospital  After the baby and the placenta are delivered, preeclampsia usually starts toimprove. Most women get better in the first few days after childbirth. After having preeclampsia, you still have a risk of seizures for a day or more after childbirth. (Very rarely, seizures happen later on.) So your doctor may have you take magnesium sulfate for a day or more to prevent seizures. You mayalso take medicine to lower your blood pressure.   When you go home  Your blood pressure will most likely return to normal a few days after delivery. Your doctor will want to check your blood pressure sometime in thefirst week after you leave the hospital.  Some women still have high blood pressure 6 weeks after childbirth. But mostreturn to normal levels over the long term. Take and record your blood pressure at home if your doctor tells you to. Ask your doctor to check your blood pressure monitor to be sure that it is accurate and that the cuff fits you. Also ask your doctor to watch you use it, to make sure that you are using it right. You should not eat, use tobacco products, or use medicine known to raise blood pressure (such as some nasal decongestant sprays) before you take your blood pressure. Avoid taking your blood pressure if you have just exercised. Also avoid taking it if you are nervous or upset. Rest at least 15 minutes before you take your blood pressure. Be safe with medicines. If you take medicine, take it exactly as prescribed. Call your doctor if you think you are having a problem with your medicine. Do not smoke. Quitting smoking will help improve your baby's growth and health. If you need help quitting, talk to your doctor about stop-smoking programs and medicines. These can increase your chances of quitting for good. Eat a balanced and healthy diet that has lots of fruits and vegetables. Long-term health  After you have had preeclampsia, you have a higher-than-average risk of heart disease, stroke, and kidney disease. This may be because the same things thatcause preeclampsia also cause heart and kidney disease. To protect your health, work with your doctor on living a heart-healthy lifestyle and getting the checkups you need. Your doctor may also want you tocheck your blood pressure at home. Follow-up care is a key part of your treatment and safety. Be sure to make and go to all appointments, and call your doctor if you are having problems.  It's also a good idea to know your test results and keep alist of the medicines you take. When should you call for help? Share this information with your partner or a friend. They can help you watch for warning signs. Call 911 anytime you think you may need emergency care. For example, call if:    You passed out (lost consciousness). You have a seizure. Call your doctor now or seek immediate medical care if:    You have symptoms of preeclampsia, such as:  Sudden swelling of your face, hands, or feet. New vision problems (such as dimness, blurring, or seeing spots). A severe headache. Your blood pressure is very high, such as 160/110 or higher. Your blood pressure is higher than your doctor told you it should be, or it rises quickly. You have new nausea or vomiting. You have pain in your belly or pelvis. Watch closely for changes in your health, and be sure to contact your doctor if:    You gain weight rapidly. Where can you learn more? Go to https://Seventymmzohaib.iBoxPay. org and sign in to your [employer] Systems account. Enter H910 in the [employer] Edutainment box to learn more about \"Learning About Preeclampsia After Childbirth. \"     If you do not have an account, please click on the \"Sign Up Now\" link. Current as of: February 23, 2022               Content Version: 13.3  © 2006-2022 Healthwise, Incorporated. Care instructions adapted under license by [facility] ([facility]). If you have questions about a medical condition or this instruction, always ask your healthcare professional. [place] [address] any warranty or liability for your use of this information.

## 2022-08-02 NOTE — ANESTHESIA PRE PROCEDURE
Department of Anesthesiology  Preprocedure Note       Name:  Noe Pace   Age:  24 y.o.  :  1995                                          MRN:  [id number]         Date:  2022      Surgeon: * No surgeons listed *    Procedure: * No procedures listed *    Medications prior to admission:   Prior to Admission medications    Medication Sig Start Date End Date Taking?  Authorizing Provider   aspirin (ASPIRIN CHILDRENS) 81 MG chewable tablet Take 1 tablet by mouth daily 3/8/22   MONI Rhodes CNM   Prenatal Vit-Fe Fumarate-FA (PRENATAL VITAMIN) 28-0.8 MG TABS tablet Take 1 tablet by mouth daily 22   MONI Rhodes CNM       Current medications:    Current Facility-Administered Medications   Medication Dose Route Frequency Provider Last Rate Last Admin    0.9 % sodium chloride infusion   IntraVENous PRN Janeth Casanova DO        desmopressin (DDAVP) 33.88 mcg in sodium chloride 0.9 % 50 mL IVPB  0.3 mcg/kg IntraVENous PRN Rupa Gardner  mL/hr at 22 33.88 mcg at 22 225    0.9 % sodium chloride infusion   IntraVENous PRN Alfred Rasmussen MD        oxytocin (PITOCIN) 30 units in 500 mL infusion  1-20 yo-units/min IntraVENous Continuous Felicitas Wong MD        ropivacaine (NAROPIN) 0.2% injection 0.2%             ropivacaine 0.2% in sodium chloride 0.9% (OB) epidural 100 mL  10 mL/hr Epidural Continuous Jared Gray MD        naloxone 0.4 mg in 10 mL sodium chloride syringe   IntraVENous PRN Jared Gray MD        nalbuphine (NUBAIN) injection 5 mg  5 mg IntraVENous Q4H PRN Jared Gray MD        ondansetron [facility]) injection 4 mg  4 mg IntraVENous Q6H PRN Jared Gray MD        lactated ringers infusion   IntraVENous Continuous Felicitas Wong  mL/hr at 22 Rate Verify at 22    lactated ringers bolus  500 mL IntraVENous PRN Felicitas Wong MD        Or    lactated ringers bolus  1,000 mL IntraVENous PRN Meka Lambert Cigarettes     Passive exposure: Yes    Smokeless tobacco: Never    Tobacco comments:     \"sister, friends, FOB, etc.\"  5/12/2022   Substance Use Topics    Alcohol use: Not Currently                                Ready to quit: Yes  Counseling given: Yes  Tobacco comments: \"sister, friends, FOB, etc.\"  5/12/2022      Vital Signs (Current):   Vitals:    08/01/22 2245 08/01/22 2250 08/01/22 2255 08/01/22 2300   BP: 138/78 125/63 131/67 130/70   Pulse: 78 74 84 77   Resp: 20 20 20 20   Temp: 36.6 °C (97.8 °F) 36.6 °C (97.9 °F) 36.5 °C (97.7 °F) 36.7 °C (98 °F)   TempSrc: Oral Oral Oral Oral   SpO2: 96% 96% 97%    Weight:       Height:                                                  BP Readings from Last 3 Encounters:   08/01/22 130/70   07/25/22 117/80   07/15/22 138/80       NPO Status:                                                                                 BMI:   Wt Readings from Last 3 Encounters:   08/01/22 249 lb (112.9 kg)   07/25/22 249 lb 6.4 oz (113.1 kg)   07/15/22 247 lb (112 kg)     Body mass index is 45.54 kg/m². CBC:   Lab Results   Component Value Date/Time    WBC 14.5 07/31/2022 09:31 PM    RBC 3.67 07/31/2022 09:31 PM    HGB 11.3 07/31/2022 09:31 PM    HCT 34.5 07/31/2022 09:31 PM    MCV 94.0 07/31/2022 09:31 PM    RDW 13.3 07/31/2022 09:31 PM     07/31/2022 09:31 PM       CMP:   Lab Results   Component Value Date/Time    GLUCOSE 81 05/04/2022 05:28 AM       POC Tests: No results for input(s): POCGLU, POCNA, POCK, POCCL, POCBUN, POCHEMO, POCHCT in the last 72 hours.     Coags: No results found for: PROTIME, INR, APTT    HCG (If Applicable):   Lab Results   Component Value Date    PREGTESTUR positive 01/25/2022        ABGs: No results found for: PHART, PO2ART, BBC8NWJ, ZWC6BIX, BEART, D8IQOQAZ     Type & Screen (If Applicable):  No results found for: LABABO, LABRH    Drug/Infectious Status (If Applicable):  Lab Results   Component Value Date/Time    HEPCAB NONREACTIVE 01/25/2022 02:53 PM       COVID-19 Screening (If Applicable): No results found for: COVID19        Anesthesia Evaluation  Patient summary reviewed and Nursing notes reviewed no history of anesthetic complications:   Airway: Mallampati: II  TM distance: >3 FB   Neck ROM: full  Mouth opening: > = 3 FB   Dental: normal exam         Pulmonary:Negative Pulmonary ROS and normal exam                               Cardiovascular:Negative CV ROS                      Neuro/Psych:   (+) psychiatric history: stable with treatment            GI/Hepatic/Renal: Neg GI/Hepatic/Renal ROS            Endo/Other:                      ROS comment: Delta storage pool deficiency Abdominal:   (+) obese,           Vascular: negative vascular ROS. Other Findings:           Anesthesia Plan      epidural     ASA 2             Anesthetic plan and risks discussed with patient.                         MONI Tobar - CRNA   8/1/2022

## 2022-08-02 NOTE — CARE COORDINATION
CM attempted to meet w/ patient at bedside to confirm demos/insurance and discuss DCP, RN went into room to provide care

## 2022-08-02 NOTE — PROGRESS NOTES
[facility] Epidural Labor Note    Subjective:     Patient is comfortable with epidural. States feeling some pressure. Leaking clear fluid. Objective:     VS:  height is 5' 2\" (1.575 m) and weight is 249 lb (112.9 kg). Her oral temperature is 98.2 °F (36.8 °C). Her blood pressure is 101/54 (abnormal) and her pulse is 91. Her respiration is 18 and oxygen saturation is 96%.      FHT:    Baseline: 130   Variability: moderate   Decels: Early   Accels: present      Contraction pattern q2-3 mins    Cx: 7 cm 80 soft -1     Assessment:    Normal labor progress  FHR Category 1  DPSD 1 u platelets, DDAVP prior to epidural.  Pitocin at 14 mu/min    Plan:   repositioned  Continue observation  Anticipate vaginal birth

## 2022-08-02 NOTE — ANESTHESIA PROCEDURE NOTES
Epidural Block    Patient location during procedure: OB  Start time: 8/1/2022 11:55 PM  Reason for block: labor epidural  Staffing  Performed: resident/CRNA   Resident/CRNA: MONI Street CRNA  Epidural  Patient position: sitting  Prep: Betadine  Patient monitoring: continuous pulse ox and frequent blood pressure checks  Approach: midline  Location: L3-4  Injection technique: CARMEN air  Guidance: paresthesia technique  Provider prep: mask and sterile gloves  Needle  Needle type: Tuohy   Needle gauge: 17 G  Needle length: 3.5 in  Needle insertion depth: 7 cm  Catheter type: end hole  Catheter size: 20 G  Catheter at skin depth: 18 cm  Test dose: negativeCatheter Secured: tegaderm  Assessment  Sensory level: T6  Hemodynamics: stable  Attempts: 3+  Outcomes: uncomplicated  Preanesthetic Checklist  Completed: patient identified, IV checked, site marked, risks and benefits discussed, surgical/procedural consents, equipment checked, pre-op evaluation, timeout performed, anesthesia consent given, oxygen available, monitors applied/VS acknowledged, fire risk safety assessment completed and verbalized and blood product R/B/A discussed and consented

## 2022-08-02 NOTE — L&D DELIVERY NOTE
Mother's Information      Labor Events     Labor?: No  Cervical Ripening:   Now               Decair, Baby Girl [facility] [[id number]]      Labor Events     Labor?: No   Steroids?: None  Cervical Ripening Date/Time:     Antibiotics Received during Labor?: No  Rupture Identifier: Sac 1   Rupture Date/Time: 22 05:57:03   Rupture Type: AROM  Fluid Color: Clear  Fluid Odor: None  Fluid Volume: Moderate  Induction: Oxytocin, Misoprostol, AROM  Labor Complications: None       Anesthesia    Method: Epidural       Start Pushing      Labor onset date/time:   Now     Dilation complete date/time:   Now     Start pushing date/time:    Decision date/time (emergent ):           Delivery ()      Delivery Date/Time:  22 09:09:00       Details:            Shoulder Dystocia    Add Second Maneuver  Add Third Maneuver  Add Fourth Maneuver  Add Fifth Maneuver  Add Sixth Maneuver  Add Seventh Maneuver  Add Eighth Maneuver  Add Ninth Maneuver       Assisted Delivery Details           Document Additional Attempt         Document Additional Attempt                 Cord           Placenta           Lacerations           Vaginal Counts        Sponges Needles Instruments   Initial Counts      Final Counts      If the count is incorrect due to Intentionally Retained Foreign Object (IRFO) add the IRFO LDA in Lines/Drains.   Add LDA: Link to [facility]       Blood Loss  Mother: Guanakito Roldan #[id number]     Start of Mother's Information      Delivery Blood Loss  22 2109 - 22 0921      None                 End of Mother's Information  Mother: Guanakito Roldan #[id number]                Delivery Providers    Delivering clinician:              [place] Assessment       Apgar Scoring Key:    0 1 2    Skin Color: Blue or pale Acrocyanotic Completely pink    Heart Rate: Absent <100 bpm >100 bpm    Reflex Irritability: No response Grimace Cry or active withdrawal    Muscle Tone: Limp Some flexion Active bleeding was under control. Cord insertion was central. The perineum and vagina were inspected and no degree laceration was found. w Pitocin was given IVafter delivery of placenta. Mother and baby stable. Baby girl in Moms arms.

## 2022-08-02 NOTE — PROGRESS NOTES
Labor Progress Note    Jermaine Toledo is a 24 y.o. female  at 38w3d  The patient was seen and examined. Her pain is well controlled and she has no complaints or concerns at this time. She reports fetal movement is present, denies contractions, denies loss of fluid, denies vaginal bleeding. Vital Signs:  Vitals:    22 0400 22 0430 22 0500 22 0530   BP: (!) 111/57 105/66 109/71 98/68   Pulse: (!) 109 97 92 (!) 119   Resp:  16     Temp:       TempSrc:       SpO2: 94% 94% 95% 92%   Weight:       Height:             FHT:  135 , moderate variability, accelerations present, decelerations absent  Contractions: regular every 3 minutes     Chaperone for Intimate Exam: Chaperone was present for entire exam, Chaperone Name: Jordy Sen RN  Cervical Exam: 3 cm dilated, 50% effaced, -1 station  Pitocin: @ 10 mu/min    Membranes: Intact  Scalp Electrode in place: absent  Intrauterine Pressure Catheter in Place: absent    Interventions: AROM (clr)     Assessment/Plan:  Jermaine Toledo is a 24 y.o. female  at 38w3d admitted for Induction of Labor secondary to gHTN   - GBS negative, No indication for GBS prophylaxis   - VSS, Afebrile   - cEFM, TOCO showing category 1 tracing   - S/p Cytotec 25 mcg PO x2   - Pitocin started at 0228   - Epidural given   - AROM (clr)    Delta storage pool disease   - Hematology consulted, recommended 1 unit platelets and DDAVP and given prior to epidural placement   - Platelet function tests wnl    Hx neurocardiogenic syncope   - Patient currently asymptomatic      Midwife updated and in agreement with plan     Daily Tobar MD  Ob/Gyn Resident  2022, 6:03 AM    Resident Physician Statement  I have discussed the case, including pertinent history and exam findings with the above resident. I have personally seen the patient. I agree with the assessment, plan and orders as documented. I have made changes to the above note as needed.  I have discussed the case with above named attending.        Idalmis Stout, DO  OB/GYN Resident PGY 4  8/2/2022  7:40 AM

## 2022-08-03 VITALS
WEIGHT: 249 LBS | HEART RATE: 76 BPM | DIASTOLIC BLOOD PRESSURE: 95 MMHG | TEMPERATURE: 97.9 F | RESPIRATION RATE: 16 BRPM | BODY MASS INDEX: 45.82 KG/M2 | HEIGHT: 62 IN | SYSTOLIC BLOOD PRESSURE: 119 MMHG | OXYGEN SATURATION: 100 %

## 2022-08-03 PROBLEM — O13.9 GESTATIONAL HYPERTENSION: Status: ACTIVE | Noted: 2022-08-03

## 2022-08-03 PROBLEM — O09.93 HRP (HIGH RISK PREGNANCY), THIRD TRIMESTER: Status: RESOLVED | Noted: 2022-07-31 | Resolved: 2022-08-03

## 2022-08-03 LAB
ABSOLUTE EOS #: 0.12 K/UL (ref 0–0.44)
ABSOLUTE IMMATURE GRANULOCYTE: 0.11 K/UL (ref 0–0.3)
ABSOLUTE LYMPH #: 3.21 K/UL (ref 1.1–3.7)
ABSOLUTE MONO #: 1.11 K/UL (ref 0.1–1.2)
ALBUMIN SERPL-MCNC: 2.9 G/DL (ref 3.5–5.2)
ALBUMIN/GLOBULIN RATIO: 1 (ref 1–2.5)
ALP BLD-CCNC: 168 U/L (ref 35–104)
ALT SERPL-CCNC: 36 U/L (ref 5–33)
ANION GAP SERPL CALCULATED.3IONS-SCNC: 10 MMOL/L (ref 9–17)
AST SERPL-CCNC: 34 U/L
BASOPHILS # BLD: 0 % (ref 0–2)
BASOPHILS ABSOLUTE: 0.05 K/UL (ref 0–0.2)
BILIRUB SERPL-MCNC: <0.1 MG/DL (ref 0.3–1.2)
BUN BLDV-MCNC: 7 MG/DL (ref 6–20)
CALCIUM SERPL-MCNC: 8.8 MG/DL (ref 8.6–10.4)
CHLORIDE BLD-SCNC: 99 MMOL/L (ref 98–107)
CO2: 20 MMOL/L (ref 20–31)
CREAT SERPL-MCNC: 0.67 MG/DL (ref 0.5–0.9)
CREATININE URINE: 87.1 MG/DL (ref 28–217)
EOSINOPHILS RELATIVE PERCENT: 1 % (ref 1–4)
GFR AFRICAN AMERICAN: >60 ML/MIN
GFR NON-AFRICAN AMERICAN: >60 ML/MIN
GFR SERPL CREATININE-BSD FRML MDRD: ABNORMAL ML/MIN/{1.73_M2}
GLUCOSE BLD-MCNC: 67 MG/DL (ref 70–99)
HCT VFR BLD CALC: 30.6 % (ref 36.3–47.1)
HEMOGLOBIN: 9.9 G/DL (ref 11.9–15.1)
IMMATURE GRANULOCYTES: 1 %
LYMPHOCYTES # BLD: 28 % (ref 24–43)
MCH RBC QN AUTO: 31.9 PG (ref 25.2–33.5)
MCHC RBC AUTO-ENTMCNC: 32.4 G/DL (ref 28.4–34.8)
MCV RBC AUTO: 98.7 FL (ref 82.6–102.9)
MONOCYTES # BLD: 10 % (ref 3–12)
NRBC AUTOMATED: 0 PER 100 WBC
PDW BLD-RTO: 13.5 % (ref 11.8–14.4)
PLATELET # BLD: 267 K/UL (ref 138–453)
PMV BLD AUTO: 9.7 FL (ref 8.1–13.5)
POTASSIUM SERPL-SCNC: 3.8 MMOL/L (ref 3.7–5.3)
RBC # BLD: 3.1 M/UL (ref 3.95–5.11)
SEG NEUTROPHILS: 60 % (ref 36–65)
SEGMENTED NEUTROPHILS ABSOLUTE COUNT: 6.98 K/UL (ref 1.5–8.1)
SODIUM BLD-SCNC: 129 MMOL/L (ref 135–144)
TOTAL PROTEIN, URINE: 12 MG/DL
TOTAL PROTEIN: 5.9 G/DL (ref 6.4–8.3)
URINE TOTAL PROTEIN CREATININE RATIO: 0.14 (ref 0–0.2)
WBC # BLD: 11.6 K/UL (ref 3.5–11.3)

## 2022-08-03 PROCEDURE — 80053 COMPREHEN METABOLIC PANEL: CPT

## 2022-08-03 PROCEDURE — 6370000000 HC RX 637 (ALT 250 FOR IP)

## 2022-08-03 PROCEDURE — 85025 COMPLETE CBC W/AUTO DIFF WBC: CPT

## 2022-08-03 PROCEDURE — 36415 COLL VENOUS BLD VENIPUNCTURE: CPT

## 2022-08-03 PROCEDURE — 84156 ASSAY OF PROTEIN URINE: CPT

## 2022-08-03 PROCEDURE — 82570 ASSAY OF URINE CREATININE: CPT

## 2022-08-03 RX ADMIN — IBUPROFEN 600 MG: 600 TABLET, FILM COATED ORAL at 11:10

## 2022-08-03 RX ADMIN — ACETAMINOPHEN 1000 MG: 500 TABLET ORAL at 08:43

## 2022-08-03 RX ADMIN — DOCUSATE SODIUM 100 MG: 100 CAPSULE ORAL at 08:43

## 2022-08-03 RX ADMIN — IBUPROFEN 600 MG: 600 TABLET, FILM COATED ORAL at 00:37

## 2022-08-03 RX ADMIN — ACETAMINOPHEN 1000 MG: 500 TABLET ORAL at 00:37

## 2022-08-03 ASSESSMENT — PAIN SCALES - GENERAL
PAINLEVEL_OUTOF10: 2
PAINLEVEL_OUTOF10: 3
PAINLEVEL_OUTOF10: 3

## 2022-08-03 ASSESSMENT — PAIN DESCRIPTION - LOCATION
LOCATION: PERINEUM
LOCATION: BACK

## 2022-08-03 ASSESSMENT — PAIN DESCRIPTION - DESCRIPTORS
DESCRIPTORS: DISCOMFORT
DESCRIPTORS: DISCOMFORT;CRAMPING;ACHING

## 2022-08-03 NOTE — CARE COORDINATION
Social Work     Sw reviewed medical record (current active problem list) and tox screens and found no concerns. Sw spoke with mom briefly to explain Sw role, inquire if any needs or concerns, and provide safe sleep education and discuss. Mom denied any needs or questions and informs baby has a safe sleep environment (bassinet). Mom denied any current s/s of anxiety or depression and is aware to reach out to OB if any s/s occur after dc. Mom reports she has ongoing anxiety she manages with self coping skills and declined any counseling resources. Mom reports a good support system with fob and both of their families and denied any current questions or needs. Parents report they have 2 other kids (10, 3) and report ped will be [facility]. Sw encouraged parents to reach out if any issues or concerns arise.

## 2022-08-03 NOTE — CARE COORDINATION
CASE MANAGEMENT POST-PARTUM TRANSITIONAL CARE PLAN    HRP (high risk pregnancy), third trimester [O09.93]    OB Provider: MMW    Writer met w/ [address]  and JADON Villareal at bedside to discuss DCP. She is S/P  on 2022 @ 40w4d at [phone] of female infant    Writer updated address, phone number correct facesheet. She states she lives with FOB. [address] [facility] verbalized no problems with transportation to and from doctors appointments or with paying for medications upon discharge home. [place] [employer] insurance correct. Writer notified [address] she has 30 days from date of birth to add  to insurance policy. She verbalized understanding. [address] [facility] confirmed a safe place for infant to sleep at home. Infant name on BC: [employer]. Infant PCP Stacey gonzalez.      DME: none  HOME CARE: none    Anticipate DC of couplet 2022    Readmission Risk              Risk of Unplanned Readmission:  [id number]

## 2022-08-03 NOTE — PROGRESS NOTES
POST PARTUM DAY # 1    Karthik Marina is a 24 y.o. female  This patient was seen & examined today.  on 22    Her pregnancy was complicated by:   Patient Active Problem List   Diagnosis    Neurologic cardiac syncope    Dysautonomia ([address]    Murmur    Asperger's syndrome    Delta storage pool disease [facility])    High risk pregnancy, antepartum    Obesity in pregnancy ([place]rav[place] BMI 40)    Susceptible to varicella (non-immune), currently pregnant    Insufficient prenatal care in first trimester    Rh+/RI/GBSneg     24 F Apg 9/ Wt 6#15       Today she is doing well without any chief complaint. Her lochia is light. She denies chest pain, shortness of breath, headache, lightheadedness and blurred vision. She is bottle feeding. She is ambulating well. Her voiding pattern is normal. I reviewed signs and symptoms of post partum depression with the patient, she currently denies any of these symptoms. She is tolerating solids.      Vital Signs:  Vitals:    22 1115 22 1141 22 1608 22 2110   BP: 119/72 120/82 (!) 112/59 (!) 116/92   Pulse: 99 99 95 88   Resp: 18 16 18 16   Temp:  98.2 °F (36.8 °C) 98.4 °F (36.9 °C) 97.5 °F (36.4 °C)   TempSrc:  Oral Oral Oral   SpO2:    100%   Weight:       Height:             Physical Exam:  General:  no apparent distress, alert and cooperative  Neurologic:  alert, oriented, normal speech, no focal findings or movement disorder noted  Lungs:  No increased work of breathing, good air exchange, no cyanosis, no accessory muscle use  Heart:  Normal apical impulse, regular rate   Abdomen: abdomen soft, non-distended, appropriately tender  Fundus: appropriately tender, firm, below umbilicus  Extremities:  no calf tenderness, non edematous    Lab:  Lab Results   Component Value Date    HGB 9.8 (L) 2022     Lab Results   Component Value Date    HCT 29.6 (L) 2022       Assessment/Plan:  Karthik Marina is a [id number] PPD # 1 s/p    - Doing well, VSS   - plan. All questions answered. Tonya Brock DO  OB/GYN Resident PGY 4  8/3/2022  2:28 AM       Date: 8/3/2022  Time: 10:01 AM      Patient Name: Renetta Huffman  Patient : 1995  Room/Bed: 2198/2676-26  Admission Date/Time: 2022  7:22 PM        Attending Physician Statement  I have personally seen, evaluated and discussed the care of Renetta Huffman, including pertinent history and exam findings with the resident. I have reviewed and edited their note in the electronic medical record. The key elements of all parts of the encounter have been performed/reviewed by me. I agree with the assessment, plan and orders as documented by the resident. The level of care submitted represents to the best of my ability the care documented in the medical record today. GC Modifier. This service has been performed in part by a resident under the direction of a teaching physician. Attending's Name:  Abby Osorio MD      Patient doing well. She has no concerns or complaints. Continue routine post-partum care. Stable for discharge.

## 2022-08-03 NOTE — FLOWSHEET NOTE
Pt discharged to private residence with personal belongings. Pt declines to be transported via wheelchair and ambulated with  by her side.

## 2022-08-03 NOTE — PROGRESS NOTES
OB/GYN RESIDENT INTERVAL NOTE      PreE labs reviewed. AST/ALT slightly increased but not criteria for PreEclampsia at this time. P/C 0.14. Patient stable for discharge at this time.     Vitals:    08/02/22 1608 08/02/22 2110 08/03/22 0400 08/03/22 0841   BP: (!) 112/59 (!) 116/92 114/86 (!) 119/95   Pulse: 95 88 78 76   Resp: 18 16 16 16   Temp: 98.4 °F (36.9 °C) 97.5 °F (36.4 °C) 98.2 °F (36.8 °C) 97.9 °F (36.6 °C)   TempSrc: Oral Oral Oral Oral   SpO2:  100%     Weight:       Height:             Recent Results (from the past 12 hour(s))   CBC with Auto Differential    Collection Time: 08/03/22  7:24 AM   Result Value Ref Range    WBC 11.6 (H) 3.5 - 11.3 k/uL    RBC 3.10 (L) 3.95 - 5.11 m/uL    Hemoglobin 9.9 (L) 11.9 - 15.1 g/dL    Hematocrit 30.6 (L) 36.3 - 47.1 %    MCV 98.7 82.6 - 102.9 fL    MCH 31.9 25.2 - 33.5 pg    MCHC 32.4 28.4 - 34.8 g/dL    RDW 13.5 11.8 - 14.4 %    Platelets 279 716 - 964 k/uL    MPV 9.7 8.1 - 13.5 fL    NRBC Automated 0.0 0.0 per 100 WBC    Seg Neutrophils 60 36 - 65 %    Lymphocytes 28 24 - 43 %    Monocytes 10 3 - 12 %    Eosinophils % 1 1 - 4 %    Basophils 0 0 - 2 %    Immature Granulocytes 1 (H) 0 %    Segs Absolute 6.98 1.50 - 8.10 k/uL    Absolute Lymph # 3.21 1.10 - 3.70 k/uL    Absolute Mono # 1.11 0.10 - 1.20 k/uL    Absolute Eos # 0.12 0.00 - 0.44 k/uL    Basophils Absolute 0.05 0.00 - 0.20 k/uL    Absolute Immature Granulocyte 0.11 0.00 - 0.30 k/uL   Comprehensive Metabolic Panel    Collection Time: 08/03/22  7:24 AM   Result Value Ref Range    Glucose 67 (L) 70 - 99 mg/dL    BUN 7 6 - 20 mg/dL    Creatinine 0.67 0.50 - 0.90 mg/dL    Calcium 8.8 8.6 - 10.4 mg/dL    Sodium 129 (L) 135 - 144 mmol/L    Potassium 3.8 3.7 - 5.3 mmol/L    Chloride 99 98 - 107 mmol/L    CO2 20 20 - 31 mmol/L    Anion Gap 10 9 - 17 mmol/L    Alkaline Phosphatase 168 (H) 35 - 104 U/L    ALT 36 (H) 5 - 33 U/L    AST 34 (H) <32 U/L    Total Bilirubin <0.10 (L) 0.3 - 1.2 mg/dL    Total Protein 5.9 (L) 6.4 - 8.3 g/dL    Albumin 2.9 (L) 3.5 - 5.2 g/dL    Albumin/Globulin Ratio 1.0 1.0 - 2.5    GFR Non-African American >60 >60 mL/min    GFR African American >60 >60 mL/min    GFR Comment         Protein / Creatinine Ratio, Urine    Collection Time: 08/03/22 11:29 AM   Result Value Ref Range    Total Protein, Urine 12 mg/dL    Creatinine, Ur 87.1 28.0 - 217.0 mg/dL    Urine Total Protein Creatinine Ratio 0.14 0.00 - 0.20         Plan discussed with senior resident and attending.         Peter Shipley DO  OB/GYN Resident  Dominic Dang  8/3/2022 12:28 PM

## 2022-08-03 NOTE — PLAN OF CARE
Problem: Vaginal Birth or  Section  Goal: Fetal and maternal status remain reassuring during the birth process  Description:  Birth OB-Pregnancy care plan goal which identifies if the fetal and maternal status remain reassuring during the birth process  8/3/2022 1306 by Belle Osgood, RN  Outcome: Completed  8/3/2022 0441 by Maik Rollins RN  Outcome: Progressing     Problem: Pain  Goal: Verbalizes/displays adequate comfort level or baseline comfort level  8/3/2022 1306 by Belle Osgood, RN  Outcome: Completed  8/3/2022 0441 by Maik Rollins RN  Outcome: Progressing     Problem: Safety - Adult  Goal: Free from fall injury  8/3/2022 1306 by Belle Osgood, RN  Outcome: Completed  8/3/2022 0441 by Maik Rollins RN  Outcome: Progressing     Problem: Discharge Planning  Goal: Discharge to home or other facility with appropriate resources  8/3/2022 1306 by Belle Osgood, RN  Outcome: Completed  8/3/2022 0441 by Maik Rollins RN  Outcome: Progressing

## 2022-08-03 NOTE — ANESTHESIA POSTPROCEDURE EVALUATION
Department of Anesthesiology  Postprocedure Note    Patient: Roselia Lacey  MRN: [id number]  [place]: 1995  Date of evaluation: 8/3/2022      Procedure Summary     Date: 08/01/22 Room / Location:     Anesthesia Start: 2325 Anesthesia Stop: 08/02/22 0909    Procedure: Labor Analgesia Diagnosis:     Scheduled Providers:  Responsible Provider: Sandy Hampton MD    Anesthesia Type: epidural ASA Status: 2          Anesthesia Type: No value filed.     Johnson Phase I: Johnson Score: 10    Johnson Phase II:        Anesthesia Post Evaluation    Patient location during evaluation: floor  Patient participation: complete - patient participated  Level of consciousness: awake  Pain score: 3  Nausea & Vomiting: no nausea  Cardiovascular status: hemodynamically stable  Respiratory status: room air